# Patient Record
Sex: FEMALE | Race: WHITE | NOT HISPANIC OR LATINO | Employment: UNEMPLOYED | URBAN - NONMETROPOLITAN AREA
[De-identification: names, ages, dates, MRNs, and addresses within clinical notes are randomized per-mention and may not be internally consistent; named-entity substitution may affect disease eponyms.]

---

## 2021-01-13 ENCOUNTER — OFFICE VISIT (OUTPATIENT)
Dept: PSYCHIATRY | Facility: CLINIC | Age: 51
End: 2021-01-13

## 2021-01-13 VITALS
DIASTOLIC BLOOD PRESSURE: 88 MMHG | BODY MASS INDEX: 29.35 KG/M2 | SYSTOLIC BLOOD PRESSURE: 126 MMHG | HEIGHT: 67 IN | HEART RATE: 89 BPM | WEIGHT: 187 LBS

## 2021-01-13 DIAGNOSIS — R45.4 ANGER: ICD-10-CM

## 2021-01-13 DIAGNOSIS — F33.2 SEVERE EPISODE OF RECURRENT MAJOR DEPRESSIVE DISORDER, WITHOUT PSYCHOTIC FEATURES (HCC): ICD-10-CM

## 2021-01-13 DIAGNOSIS — Z79.899 MEDICATION MANAGEMENT: ICD-10-CM

## 2021-01-13 DIAGNOSIS — F41.1 GAD (GENERALIZED ANXIETY DISORDER): ICD-10-CM

## 2021-01-13 DIAGNOSIS — F43.10 PTSD (POST-TRAUMATIC STRESS DISORDER): Primary | ICD-10-CM

## 2021-01-13 LAB
AMPHETAMINE CUT-OFF: ABNORMAL
BENZODIAZIPINE CUT-OFF: ABNORMAL
BUPRENORPHINE CUT-OFF: ABNORMAL
COCAINE CUT-OFF: ABNORMAL
EXTERNAL AMPHETAMINE SCREEN URINE: NEGATIVE
EXTERNAL BENZODIAZEPINE SCREEN URINE: POSITIVE
EXTERNAL BUPRENORPHINE SCREEN URINE: NEGATIVE
EXTERNAL COCAINE SCREEN URINE: NEGATIVE
EXTERNAL MDMA: NEGATIVE
EXTERNAL METHADONE SCREEN URINE: NEGATIVE
EXTERNAL METHAMPHETAMINE SCREEN URINE: NEGATIVE
EXTERNAL OPIATES SCREEN URINE: NEGATIVE
EXTERNAL OXYCODONE SCREEN URINE: NEGATIVE
EXTERNAL THC SCREEN URINE: NEGATIVE
MDMA CUT-OFF: ABNORMAL
METHADONE CUT-OFF: ABNORMAL
METHAMPHETAMINE CUT-OFF: ABNORMAL
OPIATES CUT-OFF: ABNORMAL
OXYCODONE CUT-OFF: ABNORMAL
THC CUT-OFF: ABNORMAL

## 2021-01-13 PROCEDURE — 99204 OFFICE O/P NEW MOD 45 MIN: CPT | Performed by: NURSE PRACTITIONER

## 2021-01-13 RX ORDER — QUETIAPINE FUMARATE 25 MG/1
25 TABLET, FILM COATED ORAL NIGHTLY
Qty: 30 TABLET | Refills: 0 | Status: SHIPPED | OUTPATIENT
Start: 2021-01-13 | End: 2021-03-15

## 2021-01-13 RX ORDER — NORETHINDRONE ACETATE/ETHINYL ESTRADIOL 1.5-0.03MG
KIT ORAL
COMMUNITY
Start: 2021-01-07 | End: 2021-05-10

## 2021-01-13 RX ORDER — FLUOXETINE HYDROCHLORIDE 40 MG/1
80 CAPSULE ORAL DAILY
Qty: 30 CAPSULE | Refills: 1 | Status: SHIPPED | OUTPATIENT
Start: 2021-01-13 | End: 2021-03-15 | Stop reason: SDUPTHER

## 2021-01-13 RX ORDER — LINACLOTIDE 145 UG/1
CAPSULE, GELATIN COATED ORAL
COMMUNITY
Start: 2020-12-24

## 2021-01-13 RX ORDER — ATENOLOL 25 MG/1
25 TABLET ORAL DAILY
COMMUNITY
End: 2021-06-21

## 2021-01-13 RX ORDER — ONDANSETRON HYDROCHLORIDE 8 MG/1
TABLET, FILM COATED ORAL EVERY 8 HOURS PRN
COMMUNITY

## 2021-01-13 RX ORDER — GABAPENTIN 300 MG/1
CAPSULE ORAL
COMMUNITY
Start: 2020-12-31

## 2021-01-13 RX ORDER — CYCLOBENZAPRINE HCL 10 MG
TABLET ORAL
COMMUNITY
Start: 2020-12-31 | End: 2021-06-21 | Stop reason: SDUPTHER

## 2021-01-13 RX ORDER — PRAZOSIN HYDROCHLORIDE 1 MG/1
1 CAPSULE ORAL 2 TIMES DAILY PRN
Qty: 60 CAPSULE | Refills: 0 | Status: SHIPPED | OUTPATIENT
Start: 2021-01-13 | End: 2021-03-15 | Stop reason: SINTOL

## 2021-01-13 RX ORDER — FLUOXETINE HYDROCHLORIDE 20 MG/1
CAPSULE ORAL
COMMUNITY
Start: 2021-01-11 | End: 2021-01-13 | Stop reason: HOSPADM

## 2021-01-13 RX ORDER — IBUPROFEN 600 MG/1
TABLET ORAL
COMMUNITY
Start: 2020-12-31

## 2021-01-13 RX ORDER — LORATADINE 10 MG/1
TABLET ORAL DAILY PRN
COMMUNITY
Start: 2021-01-11

## 2021-01-13 RX ORDER — HYDROCODONE BITARTRATE AND ACETAMINOPHEN 5; 325 MG/1; MG/1
TABLET ORAL
COMMUNITY
Start: 2020-12-24

## 2021-01-13 RX ORDER — LATANOPROST 50 UG/ML
SOLUTION/ DROPS OPHTHALMIC
COMMUNITY
Start: 2020-12-17

## 2021-01-13 RX ORDER — VALSARTAN 80 MG/1
TABLET ORAL
COMMUNITY
Start: 2020-12-29 | End: 2021-06-21 | Stop reason: SDDI

## 2021-01-13 RX ORDER — OMEPRAZOLE 20 MG/1
CAPSULE, DELAYED RELEASE ORAL
COMMUNITY
Start: 2021-01-11

## 2021-01-13 RX ORDER — DIAZEPAM 5 MG/1
TABLET ORAL
COMMUNITY
Start: 2020-12-17

## 2021-01-13 RX ORDER — MISOPROSTOL 100 UG/1
TABLET ORAL
COMMUNITY
Start: 2020-11-12 | End: 2021-03-16 | Stop reason: CLARIF
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
## 2021-03-15 ENCOUNTER — OFFICE VISIT (OUTPATIENT)
Dept: PSYCHIATRY | Facility: CLINIC | Age: 51
End: 2021-03-15

## 2021-03-15 VITALS
HEIGHT: 67 IN | DIASTOLIC BLOOD PRESSURE: 86 MMHG | HEART RATE: 91 BPM | BODY MASS INDEX: 27.53 KG/M2 | SYSTOLIC BLOOD PRESSURE: 142 MMHG | WEIGHT: 175.4 LBS

## 2021-03-15 DIAGNOSIS — F43.10 PTSD (POST-TRAUMATIC STRESS DISORDER): Primary | ICD-10-CM

## 2021-03-15 DIAGNOSIS — R45.4 ANGER: ICD-10-CM

## 2021-03-15 DIAGNOSIS — F33.2 SEVERE EPISODE OF RECURRENT MAJOR DEPRESSIVE DISORDER, WITHOUT PSYCHOTIC FEATURES (HCC): ICD-10-CM

## 2021-03-15 DIAGNOSIS — F41.1 GENERALIZED ANXIETY DISORDER WITH PANIC ATTACKS: ICD-10-CM

## 2021-03-15 DIAGNOSIS — Z79.899 MEDICATION MANAGEMENT: ICD-10-CM

## 2021-03-15 DIAGNOSIS — F41.0 GENERALIZED ANXIETY DISORDER WITH PANIC ATTACKS: ICD-10-CM

## 2021-03-15 PROCEDURE — 99214 OFFICE O/P EST MOD 30 MIN: CPT | Performed by: NURSE PRACTITIONER

## 2021-03-15 RX ORDER — BUSPIRONE HYDROCHLORIDE 7.5 MG/1
7.5 TABLET ORAL 2 TIMES DAILY
Qty: 30 TABLET | Refills: 1 | Status: SHIPPED | OUTPATIENT
Start: 2021-03-15 | End: 2021-04-12

## 2021-03-15 RX ORDER — FLUOXETINE HYDROCHLORIDE 40 MG/1
80 CAPSULE ORAL DAILY
Qty: 30 CAPSULE | Refills: 1 | Status: SHIPPED | OUTPATIENT
Start: 2021-03-15 | End: 2021-04-12 | Stop reason: SDUPTHER

## 2021-03-15 RX ORDER — FLUOXETINE HYDROCHLORIDE 20 MG/1
CAPSULE ORAL
COMMUNITY
Start: 2021-03-11 | End: 2021-03-15

## 2021-03-15 RX ORDER — ARIPIPRAZOLE 2 MG/1
2 TABLET ORAL DAILY
Qty: 30 TABLET | Refills: 1 | Status: SHIPPED | OUTPATIENT
Start: 2021-03-15 | End: 2021-04-12

## 2021-03-15 NOTE — PROGRESS NOTES
Chief Complaint  Depression, anxiety, panic trauma symptoms, anger    Subjective        Dian Tanner presents to [facility] for her follow up  for psychotropic medication management     History of Present Illness   Dian brewer was recently quarantined for  Covid, and got out on 2/22/21 she ran a temperature for 3 weeks and still feels tired.  Her  also had Covid, had to be hospitalized  and remains on home oxygen.  She reports she takes Valium and Flexeril every night and is sleeping better.She has no problems falling asleep or staying asleep and has had no nightmares since her last visit.  Average duration is 5-6 hours.  She has been taking a 2-hour nap during the day since being diagnosed with COVID. She reports her diet is good.  She is counting carbohydrates with a goal to lose weight.  Her mood continues to vary day-to-day.  She reports going from happy to mad without cause.  She sits and thinks about events from her past and things she should have done differently. She reports anger is related to her step dad and  harbors resentment and regret over not being more protective over her mother.  She was blamed for everything as a child and assumes blame for everything as an adult. She catastrophizes situations. She  does not ask her kids to come over, because she is afraid they will be in a car accident on the way over and she would be responsible.  Her PHQ score 17.  She identifies the following symptoms of MDD occurring over the last two weeks: anhedonia, feeling depressed, fatigue, anorexia, poor self-esteem, trouble concentrating, restlessness.  Her ANAI score is 20.  She identifies the following symptoms: Feeling nervous, inability to control worry, generalized worry, trouble relaxing, restlessness, irritability, and catastrophizing.  She experienced panic attacks daily when her  was in the hospital .They were spontaneous and lasted 15-20 minutes.   "Symptoms include SOA, increased heart rate, cold sweats, chills, tremors.  She reports over the years she has learned to manage them by breathing techniques but they still occur and are bothersome.    Objective   Vital Signs:   /86   Pulse 91   Ht 170.2 cm (67\")   Wt 79.6 kg (175 lb 6.4 oz)   BMI 27.47 kg/m²       PHQ-9 Score:   PHQ-9 Total Score: 17     Mental Status Exam:   Hygiene:   good  Cooperation:  Cooperative  Eye Contact:  Good  Psychomotor Behavior:  Appropriate  Affect:  Appropriate  Mood:  depressed  Speech:  Normal  Thought Process:  Goal directed and Linear  Thought Content:  Normal recalling events of her past  Suicidal:  None  Homicidal:  None  Hallucinations:  None  Delusion:  None  Memory:  Intact  Orientation:  Person, Place, Time and Situation  Reliability:  good  Insight:  Good  Judgement:  Good  Impulse Control:  Fair  Physical/Medical Issues:  HTN, arthritis, chronic back pain     Current Medications:   Current Outpatient Medications   Medication Sig Dispense Refill   • atenolol (TENORMIN) 25 MG tablet Take 25 mg by mouth Daily.     • cyclobenzaprine (FLEXERIL) 10 MG tablet      • diazePAM (VALIUM) 5 MG tablet      • gabapentin (NEURONTIN) 300 MG capsule      • HYDROcodone-acetaminophen (NORCO) 5-325 MG per tablet      • ibuprofen (ADVIL,MOTRIN) 600 MG tablet      • Linzess 145 MCG capsule capsule      • loratadine (CLARITIN) 10 MG tablet      • omeprazole (priLOSEC) 20 MG capsule      • valsartan (DIOVAN) 80 MG tablet      • ARIPiprazole (Abilify) 2 MG tablet Take 1 tablet by mouth Daily. 30 tablet 1   • busPIRone (BUSPAR) 7.5 MG tablet Take 1 tablet by mouth 2 (Two) Times a Day. Take always with food or always without food 30 tablet 1   • FLUoxetine (PROzac) 40 MG capsule Take 2 capsules by mouth Daily for 30 days. 30 capsule 1   • Yinka 1.5/30 1.5-30 MG-MCG tablet      • latanoprost (XALATAN) 0.005 % ophthalmic solution      • ondansetron (ZOFRAN) 8 MG tablet Take  by mouth Every " 8 (Eight) Hours As Needed for Nausea or Vomiting.       No current facility-administered medications for this visit.     Physical Exam  Vitals reviewed.   Constitutional:       Appearance: Normal appearance. She is normal weight.   Neurological:      Mental Status: She is alert and oriented to person, place, and time.      Gait: Gait is intact.   Psychiatric:         Attention and Perception: Attention and perception normal.         Mood and Affect: Mood is depressed.         Speech: Speech normal.         Behavior: Behavior normal. Behavior is cooperative.         Thought Content: Thought content normal.         Cognition and Memory: She exhibits impaired remote memory.      Comments: Fatigue, anhedonia, anorexia, trouble concentrating, restlessnes, irritability, racing heart, SOA, tremors, chills, anxiety         .Diagnoses and all orders for this visit:    1. PTSD (post-traumatic stress disorder) (Primary)  -     FLUoxetine (PROzac) 40 MG capsule; Take 2 capsules by mouth Daily for 30 days.  Dispense: 30 capsule; Refill: 1  -     ARIPiprazole (Abilify) 2 MG tablet; Take 1 tablet by mouth Daily.  Dispense: 30 tablet; Refill: 1    2. Severe episode of recurrent major depressive disorder, without psychotic features (CMS/HCC)  -     FLUoxetine (PROzac) 40 MG capsule; Take 2 capsules by mouth Daily for 30 days.  Dispense: 30 capsule; Refill: 1  -     ARIPiprazole (Abilify) 2 MG tablet; Take 1 tablet by mouth Daily.  Dispense: 30 tablet; Refill: 1  -     busPIRone (BUSPAR) 7.5 MG tablet; Take 1 tablet by mouth 2 (Two) Times a Day. Take always with food or always without food  Dispense: 30 tablet; Refill: 1    3. Generalized anxiety disorder with panic attacks  -     FLUoxetine (PROzac) 40 MG capsule; Take 2 capsules by mouth Daily for 30 days.  Dispense: 30 capsule; Refill: 1  -     busPIRone (BUSPAR) 7.5 MG tablet; Take 1 tablet by mouth 2 (Two) Times a Day. Take always with food or always without food  Dispense: 30  tablet; Refill: 1    4. Anger  -     FLUoxetine (PROzac) 40 MG capsule; Take 2 capsules by mouth Daily for 30 days.  Dispense: 30 capsule; Refill: 1  -     ARIPiprazole (Abilify) 2 MG tablet; Take 1 tablet by mouth Daily.  Dispense: 30 tablet; Refill: 1  -     busPIRone (BUSPAR) 7.5 MG tablet; Take 1 tablet by mouth 2 (Two) Times a Day. Take always with food or always without food  Dispense: 30 tablet; Refill: 1    5. Medication management    Patient reports she was unable to tolerate the prazosin and the Seroquel due to it increasing her heart rate and blood pressure.  However she is uncertain if it was a medication for panic attack.  Dian's treatment priorities for today include anger and anxiety.  She reports she is taking the Prozac but it is not helping anger.  Patient advised Prozac is indicated for anxiety depression rather than agitation.  Benefits, risks, potential side effects of adding BuSpar and Abilify discussed.  Patient and provider collaboratively agree to add the medications to her treatment plan.    -Continue Prozac 40 mg - 2 capsules daily for symptoms of anxiety and depression  -Start BuSpar 7.5 mg twice daily for symptoms of anxiety.  Patient advised to always take medication with food or without food to increase absorption  -Start Abilify 2 mg daily for symptoms of agitation and to help augment Prozac for depression  -May consider adding Lamictal at a later date  Reports reviewed include [facility] report, [facility] NP note dated 1/13/2021    TREATMENT PLAN/GOALS:    Short Term  1. Pt will keep all appts for med mgt and psychotherapy  2. Pt will take medications as prescribed and report intolerable side effects  3. Pt will pursue psychotherapy services to help gain coping skill and process trauma    Long term:   1. Pt will exhibit emotional stability  2. Pt will use coping skills to work through depression  3. Pt will manage feeling of anger using anger management strategies    MEDICATION ISSUES:  We  discussed risks, benefits, and side effects of the above medications and the patient was agreeable with the plan. Patient was educated on the importance of compliance with treatment and follow-up appointments.  Patient is agreeable to call the office with any worsening of symptoms or onset of side effects. Patient is agreeable to call 911 or go to the nearest ER should she begin having SI/HI.     MEDS ORDERED DURING VISIT:  New Medications Ordered This Visit   Medications   • FLUoxetine (PROzac) 40 MG capsule     Sig: Take 2 capsules by mouth Daily for 30 days.     Dispense:  30 capsule     Refill:  1   • ARIPiprazole (Abilify) 2 MG tablet     Sig: Take 1 tablet by mouth Daily.     Dispense:  30 tablet     Refill:  1   • busPIRone (BUSPAR) 7.5 MG tablet     Sig: Take 1 tablet by mouth 2 (Two) Times a Day. Take always with food or always without food     Dispense:  30 tablet     Refill:  1     Follow Up   Return in about 4 weeks (around 4/12/2021).    This document has been electronically signed by DELVIS Fox  March 15, 2021 10:18 EDT    Part of this note may be an electronic transcription/translation of spoken language to printed text using the Dragon Dictation System.

## 2021-03-16 ENCOUNTER — OFFICE VISIT (OUTPATIENT)
Dept: PSYCHIATRY | Facility: CLINIC | Age: 51
End: 2021-03-16

## 2021-03-16 DIAGNOSIS — F41.1 GENERALIZED ANXIETY DISORDER WITH PANIC ATTACKS: ICD-10-CM

## 2021-03-16 DIAGNOSIS — F33.2 SEVERE EPISODE OF RECURRENT MAJOR DEPRESSIVE DISORDER, WITHOUT PSYCHOTIC FEATURES (HCC): ICD-10-CM

## 2021-03-16 DIAGNOSIS — F51.4 ADULT NIGHT TERRORS: ICD-10-CM

## 2021-03-16 DIAGNOSIS — F43.10 PTSD (POST-TRAUMATIC STRESS DISORDER): Primary | ICD-10-CM

## 2021-03-16 DIAGNOSIS — F41.0 GENERALIZED ANXIETY DISORDER WITH PANIC ATTACKS: ICD-10-CM

## 2021-03-16 PROCEDURE — 90791 PSYCH DIAGNOSTIC EVALUATION: CPT | Performed by: COUNSELOR

## 2021-03-16 PROCEDURE — 90785 PSYTX COMPLEX INTERACTIVE: CPT | Performed by: COUNSELOR
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
## 2021-03-31 NOTE — PLAN OF CARE
Problem: Depression  Goal: Patient will demonstrate the ability to initiate new constructive life skills outside of sessions on a consistent basis.  Outcome: Ongoing, Progressing     Problem: Post Traumatic Stress  Goal: Patient will process and move through trauma in a way that improves self regard and the patients ability to function optimally in the world around them.  Outcome: Ongoing, Progressing

## 2021-03-31 NOTE — TREATMENT PLAN
Multi-Disciplinary Problems (from Behavioral Health Treatment Plan)    Active Problems     Problem: Depression  Start Date: 03/16/21    Problem Details: The patient self-scales this problem as a 7 with 10 being the worst.        Goal Priority Start Date Expected End Date End Date    Patient will demonstrate the ability to initiate new constructive life skills outside of sessions on a consistent basis. -- 03/16/21 -- --    Goal Details: Progress toward goal:  Not appropriate to rate progress toward goal since this is the initial treatment plan.        Goal Intervention Frequency Start Date End Date    Assist patient in setting attainable activities of daily living goals. PRN 03/16/21 --    Goal Intervention Frequency Start Date End Date    Provide education about depression Weekly 03/16/21 --    Intervention Details: Duration of treatment until until remission of symptoms.        Goal Intervention Frequency Start Date End Date    Assist patient in developing healthy coping strategies. Weekly 03/16/21 --    Intervention Details: Duration of treatment until until remission of symptoms.              Problem: Post Traumatic Stress  Start Date: 03/16/21    Problem Details: The patient self-scales this problem as a 9 with 10 being the worst.        Goal Priority Start Date Expected End Date End Date    Patient will process and move through trauma in a way that improves self regard and the patients ability to function optimally in the world around them. -- 03/16/21 -- --    Goal Details: Progress toward goal:  Not appropriate to rate progress toward goal since this is the initial treatment plan.        Goal Intervention Frequency Start Date End Date    Assist patient in identifying ways that trauma has negatively impacted their view of themselves and the world. Weekly 03/16/21 --    Intervention Details: Duration of treatment until until remission of symptoms.        Goal Intervention Frequency Start Date End Date     Process trauma in the context of the safe session environment. Weekly 03/16/21 --    Intervention Details: Duration of treatment until until remission of symptoms.        Goal Intervention Frequency Start Date End Date    Develop a plan of behavior changes that will reduce the stress of the trauma. Weekly 03/16/21 --    Intervention Details: Duration of treatment until until remission of symptoms.                           I have discussed and reviewed this treatment plan with the patient.  It has been printed for signatures.

## 2021-03-31 NOTE — PATIENT INSTRUCTIONS
"[email or web address]">   Generalized Anxiety Disorder, Adult  Generalized anxiety disorder (ANAI) is a mental health condition. Unlike normal worries, anxiety related to ANAI is not triggered by a specific event. These worries do not fade or get better with time. ANAI interferes with relationships, work, and school.  ANAI symptoms can vary from mild to severe. People with severe ANAI can have intense waves of anxiety with physical symptoms that are similar to panic attacks.  What are the causes?  The exact cause of ANAI is not known, but the following are believed to have an impact:  · Differences in natural brain chemicals.  · Genes passed down from parents to children.  · Differences in the way threats are perceived.  · Development during childhood.  · Personality.  What increases the risk?  The following factors may make you more likely to develop this condition:  · Being female.  · Having a family history of anxiety disorders.  · Being very shy.  · Experiencing very stressful life events, such as the death of a loved one.  · Having a very stressful family environment.  What are the signs or symptoms?  People with ANAI often worry excessively about many things in their lives, such as their health and family. Symptoms may also include:  · Mental and emotional symptoms:  ? Worrying excessively about natural disasters.  ? Fear of being late.  ? Difficulty concentrating.  ? Fears that others are judging your performance.  · Physical symptoms:  ? Fatigue.  ? Headaches, muscle tension, muscle twitches, trembling, or feeling shaky.  ? Feeling like your heart is pounding or beating very fast.  ? Feeling out of breath or like you cannot take a deep breath.  ? Having trouble falling asleep or staying asleep, or experiencing restlessness.  ? Sweating.  ? Nausea, diarrhea, or irritable bowel syndrome (IBS).  · Behavioral symptoms:  ? Experiencing erratic moods or irritability.  ? Avoidance of new situations.  ? Avoidance of " people.  ? Extreme difficulty making decisions.  How is this diagnosed?  This condition is diagnosed based on your symptoms and medical history. You will also have a physical exam. Your health care provider may perform tests to rule out other possible causes of your symptoms.  To be diagnosed with ANAI, a person must have anxiety that:  · Is out of his or her control.  · Affects several different aspects of his or her life, such as work and relationships.  · Causes distress that makes him or her unable to take part in normal activities.  · Includes at least three symptoms of ANAI, such as restlessness, fatigue, trouble concentrating, irritability, muscle tension, or sleep problems.  Before your health care provider can confirm a diagnosis of ANAI, these symptoms must be present more days than they are not, and they must last for 6 months or longer.  How is this treated?  This condition may be treated with:  · Medicine. Antidepressant medicine is usually prescribed for long-term daily control. Anti-anxiety medicines may be added in severe cases, especially when panic attacks occur.  · Talk therapy (psychotherapy). Certain types of talk therapy can be helpful in treating ANAI by providing support, education, and guidance. Options include:  ? Cognitive behavioral therapy (CBT). People learn coping skills and self-calming techniques to ease their physical symptoms. They learn to identify unrealistic thoughts and behaviors and to replace them with more appropriate thoughts and behaviors.  ? Acceptance and commitment therapy (ACT). This treatment teaches people how to be mindful as a way to cope with unwanted thoughts and feelings.  ? Biofeedback. This process trains you to manage your body's response (physiological response) through breathing techniques and relaxation methods. You will work with a therapist while machines are used to monitor your physical symptoms.  · Stress management techniques. These include yoga,  meditation, and exercise.  A mental health specialist can help determine which treatment is best for you. Some people see improvement with one type of therapy. However, other people require a combination of therapies.  Follow these instructions at home:  Lifestyle  · Maintain a consistent routine and schedule.  · Anticipate stressful situations. Create a plan, and allow extra time to work with your plan.  · Practice stress management or self-calming techniques that you have learned from your therapist or your health care provider.  General instructions  · Take over-the-counter and prescription medicines only as told by your health care provider.  · Understand that you are likely to have setbacks. Accept this and be kind to yourself as you persist to take better care of yourself.  · Recognize and accept your accomplishments, even if you  them as small.  · Keep all follow-up visits as told by your health care provider. This is important.  Contact a health care provider if:  · Your symptoms do not get better.  · Your symptoms get worse.  · You have signs of depression, such as:  ? A persistently sad or irritable mood.  ? Loss of enjoyment in activities that used to bring you amina.  ? Change in weight or eating.  ? Changes in sleeping habits.  ? Avoiding friends or family members.  ? Loss of energy for normal tasks.  ? Feelings of guilt or worthlessness.  Get help right away if:  · You have serious thoughts about hurting yourself or others.  If you ever feel like you may hurt yourself or others, or have thoughts about taking your own life, get help right away. Go to your nearest emergency department or:  · Call your local emergency services (781 in the [place]  · Call a suicide crisis helpline, such as the National Suicide Prevention Lifeline at 1-570.735.8400. This is open 24 hours a day in the [place]  · Text the Crisis Text Line at 193958 (in the [place]  Summary  · Generalized anxiety disorder (ANAI) is a mental  health condition that involves worry that is not triggered by a specific event.  · People with ANAI often worry excessively about many things in their lives, such as their health and family.  · ANAI may cause symptoms such as restlessness, trouble concentrating, sleep problems, frequent sweating, nausea, diarrhea, headaches, and trembling or muscle twitching.  · A mental health specialist can help determine which treatment is best for you. Some people see improvement with one type of therapy. However, other people require a combination of therapies.  This information is not intended to replace advice given to you by your health care provider. Make sure you discuss any questions you have with your health care provider.  Document Revised: 10/07/2020 Document Reviewed: 10/07/2020  Elsevier Patient Education © 2021 [employer]  Major Depressive Disorder, Adult  Major depressive disorder is a mental health condition. This disorder affects feelings. It can also affect the body. Symptoms of this condition last most of the day, almost every day, for 2 weeks. This disorder can affect:  · Relationships.  · Daily activities, such as work and school.  · Activities that you normally like to do.  What are the causes?  The cause of this condition is not known. The disorder is likely caused by a mix of things, including:  · Your personality, such as being a shy person.  · Your behavior, or how you act toward others.  · Your thoughts and feelings.  · Too much alcohol or drugs.  · How you react to stress.  · Health and mental problems that you have had for a long time.  · Things that hurt you in the past (trauma).  · Big changes in your life, such as divorce.  What increases the risk?  The following factors may make you more likely to develop this condition:  · Having family members with depression.  · Being a woman.  · Problems in the family.  · Low levels of some brain chemicals.  · Things that caused you pain as a child,  especially if you lost a parent or were abused.  · A lot of stress in your life, such as from:  ? Living without basic needs of life, such as food and shelter.  ? Being treated poorly because of race, sex, or Orthodox (discrimination).  · Health and mental problems that you have had for a long time.  What are the signs or symptoms?  The main symptoms of this condition are:  · Being sad all the time.  · Being grouchy all the time.  · Loss of interest in things and activities.  Other symptoms include:  · Sleeping too much or too little.  · Eating too much or too little.  · Gaining or losing weight, without knowing why.  · Feeling tired or having low energy.  · Being restless and weak.  · Feeling hopeless, worthless, or guilty.  · Trouble thinking clearly or making decisions.  · Thoughts of hurting yourself or others, or thoughts of ending your life.  · Spending a lot of time alone.  · Inability to complete common tasks of daily life.  If you have very bad MDD, you may:  · Believe things that are not true.  · Hear, see, taste, or feel things that are not there.  · Have mild depression that lasts for at least 2 years.  · Feel very sad and hopeless.  · Have trouble speaking or moving.  How is this treated?  This condition may be treated with:  · Talk therapy. This teaches you to know bad thoughts, feelings, and actions and how to change them.  ? This can also help you to communicate with others.  ? This can be done with members of your family.  · Medicines. These can be used to treat worry (anxiety), depression, or low levels of chemicals in the brain.  · Lifestyle changes. You may need to:  ? Limit alcohol use.  ? Limit drug use.  ? Get regular exercise.  ? Get plenty of sleep.  ? Make healthy eating choices.  ? Spend more time outdoors.  · Brain stimulation. This treatment excites the brain. This is done when symptoms are very bad or have not gotten better with other treatments.  Follow these instructions at  home:  Activity  · Get regular exercise as told.  · Spend time outdoors as told.  · Make time to do the things you enjoy.  · Find ways to deal with stress. Try to:  ? Meditate.  ? Do deep breathing.  ? Spend time in nature.  ? Keep a journal.  · Return to your normal activities as told by your doctor. Ask your doctor what activities are safe for you.  Alcohol and drug use  · If you drink alcohol:  ? Limit how much you use to:  § 0-1 drink a day for women.  § 0-2 drinks a day for men.  ? Be aware of how much alcohol is in your drink. In the [place] one drink equals one 12 oz bottle of beer (355 mL), one 5 oz glass of wine (148 mL), or one 1½ oz glass of hard liquor (44 mL).  · Talk to your doctor about:  ? Alcohol use. Alcohol can affect some medicines.  ? Any drug use.  General instructions    · Take over-the-counter and prescription medicines and herbal preparations only as told by your doctor.  · Eat a healthy diet.  · Get a lot of sleep.  · Think about joining a support group. Your doctor may be able to suggest one.  · Keep all follow-up visits as told by your doctor. This is important.  Where to find more information:  · National [place] on Mental Illness: www.rajinder.org  · [place] National [place] of Mental Health: www.nimh.nih.gov  · American Psychiatric Association: www.psychiatry.org/patients-families/  Contact a doctor if:  · Your symptoms get worse.  · You get new symptoms.  Get help right away if:  · You hurt yourself.  · You have serious thoughts about hurting yourself or others.  · You see, hear, taste, smell, or feel things that are not there.  If you ever feel like you may hurt yourself or others, or have thoughts about taking your own life, get help right away. Go to your nearest emergency department or:  · Call your local emergency services (911 in the [place]  · Call a suicide crisis helpline, such as the National Suicide Prevention Lifeline at 1-453.840.3796. This is open 24 hours a day in the  [place]  · Text the Crisis Text Line at 793381 (in the [place]  Summary  · Major depressive disorder is a mental health condition. This disorder affects feelings. Symptoms of this condition last most of the day, almost every day, for 2 weeks.  · The symptoms of this disorder can cause problems with relationships and with daily activities.  · There are treatments and support for people who get this disorder. You may need more than one type of treatment.  · Get help right away if you have serious thoughts about hurting yourself or others.  This information is not intended to replace advice given to you by your health care provider. Make sure you discuss any questions you have with your health care provider.  Document Revised: 11/28/2020 Document Reviewed: 11/28/2020  [employer] Patient Education © 2021 [employer]  Post-Traumatic Stress Disorder, Adult  Post-traumatic stress disorder (PTSD) is a mental health disorder that can occur after a traumatic event, such as a threat to life, serious injury, or sexual violence. Sometimes, PTSD can occur in people who hear about trauma that occurs to a close family member or friend. PTSD can happen to anyone at any age.  What are the causes?  The condition may be caused by experiencing a traumatic event.  What increases the risk?  This condition is more likely to occur in:  ·  servicemen and servicewomen.  · People who are in circumstances where their lives are threatened.  · People who have been the victim of, or witness to, a traumatic event, such as:  ? Domestic violence.  ? Physical or sexual abuse.  ? Rape.  ? A terrorist act or gun violence.  ? Natural disasters.  ? Accidents involving serious injury.  What are the signs or symptoms?  PTSD symptoms may start soon after a frightening event or months or years later. Symptoms last at least one month and tend to disrupt relationships, work, and daily activities. Symptoms of PTSD can be grouped into several  categories.  Intrusive symptoms  This is when you re-experience the physical and emotional sensations of the traumatic event through one or more of the following ways:  · Having upsetting dreams.  · Feeling fear, horror, intense sadness, or anger in response to a reminder of the trauma.  · Having unwanted upsetting memories while awake.  · Having physical reactions triggered by reminders of the trauma, such as increased heart rate, shortness of breath, sweating, and shaking.  · Having flashbacks, or feeling like you are going through the event again.  Avoidance symptoms  This is when you avoid anything that reminds you of the trauma. Symptoms may also include:  · Losing interest or not participating in daily activities.  · Feeling disconnected from or avoiding other people.  · Isolating yourself.  Increased arousal symptoms  You may have physical or emotional reactions triggered by your environment. Symptoms may include:  · Being easily startled.  · Behaving in a careless or self-destructive way.  · Becoming easily irritated.  · Feeling worried and nervous.  · Having trouble concentrating.  · Yelling at or hitting other people or objects.  · Having trouble sleeping.  Negative mood and thoughts  · Believing that you or others are bad.  · Feeling fear, horror, anger, sadness, guilt, or shame regularly.  · Not being able to remember certain parts of the traumatic event.  · Blaming yourself or others for the trauma.  · Being unable to experience positive emotions, such as happiness or love.  How is this diagnosed?  PTSD is diagnosed through an assessment by a mental health professional. You will be asked questions about your symptoms.  How is this treated?  Treatment for this condition may include any of the following or a combination:  · Taking medicines to reduce PTSD symptoms.  · Having counseling with a mental health professional or therapist who is experienced in treating PTSD.  · Doing eye movement desensitization  and reprocessing therapy (EMDR). This type of therapy occurs with a specialized therapist.  If you have other mental health concerns, these conditions will also be treated.  Follow these instructions at home:  Lifestyle  · Find a support group in your community. Groups are often available for  veterans, trauma victims, and family members or caregivers.  · Try to get 7-9 hours of sleep each night. To help with sleep:  ? Keep your bedroom cool and dark.  ? Do not eat a heavy meal within 1 hour of bedtime.  ? Do not drink alcohol or caffeinated drinks before bed.  ? Avoid screen time, such as television, computers, tablets, or mobile phones, before bed.  · Do not use illegal drugs.  · Contact a local organization to find out if you are eligible for a service dog.  Activity  · Exercise regularly. Try to do at least 30 minutes of physical activity most days of the week.  · Practice self-calming through:  ? Breathing exercises.  ? Meditation.  ? Yoga.  ? Listening to quiet music.  · Do not isolate yourself. Make connections with other people.  · Consider volunteering. Volunteering can help you feel more connected.  Eating and drinking  · Do not drink alcohol if:  ? Your health care provider tells you not to drink.  ? You are pregnant, may be pregnant, or are planning to become pregnant.  · If you drink alcohol:  ? Limit how much you use to:  § 0-1 drink a day for women.  § 0-2 drinks a day for men.  ? Be aware of how much alcohol is in your drink. In the [place] one drink equals one 12 oz bottle of beer (355 mL), one 5 oz glass of wine (148 mL), or one 1½ oz glass of hard liquor (44 mL).  General instructions  · Take steps to help yourself feel safer at home, such as by installing a security system.  · Work with a health care provider or therapist to help manage your symptoms.  · Take over-the-counter and prescription medicines as told by your health care provider.  · Let others know that you have PTSD and the  things that may trigger symptoms. This can protect you and help them understand you better.  · If your PTSD is affecting your marriage or family, seek help from a family therapist.  · Make sure to let all of your health care providers know you have PTSD. This is especially important if you are having surgery or need to be admitted to the hospital.  · Keep all follow-up visits as told by your health care provider. This is important.  Contact a health care provider if:  · Your symptoms do not get better.  · You are feeling overwhelmed by your symptoms.  Get help right away if:  · You have thoughts of hurting yourself or others.  If you ever feel like you may hurt yourself or others, or have thoughts about taking your own life, get help right away. You can go to your nearest emergency department or call:  · Your local emergency services (911 in the [place]  · A suicide crisis helpline, such as the National Suicide Prevention Lifeline at 1-170.582.4289. This is open 24 hours a day.  Summary  · Post-traumatic stress disorder (PTSD) is a mental health disorder that can occur after a traumatic event.  · Treatment for PTSD may include medicines, counseling, eye movement desensitization and reprocessing therapy (EMDR), or a combination of therapies.  · Find a support group in your community.  · Get help right away if you have thoughts of hurting yourself or others.  This information is not intended to replace advice given to you by your health care provider. Make sure you discuss any questions you have with your health care provider.  Document Revised: 02/27/2020 Document Reviewed: 02/27/2020  Elsevier Patient Education © 2021 [employer]

## 2021-04-12 ENCOUNTER — OFFICE VISIT (OUTPATIENT)
Dept: PSYCHIATRY | Facility: CLINIC | Age: 51
End: 2021-04-12

## 2021-04-12 VITALS
DIASTOLIC BLOOD PRESSURE: 84 MMHG | HEART RATE: 92 BPM | WEIGHT: 171.6 LBS | BODY MASS INDEX: 26.93 KG/M2 | HEIGHT: 67 IN | SYSTOLIC BLOOD PRESSURE: 122 MMHG

## 2021-04-12 DIAGNOSIS — F41.1 GENERALIZED ANXIETY DISORDER WITH PANIC ATTACKS: ICD-10-CM

## 2021-04-12 DIAGNOSIS — F33.2 SEVERE EPISODE OF RECURRENT MAJOR DEPRESSIVE DISORDER, WITHOUT PSYCHOTIC FEATURES (HCC): ICD-10-CM

## 2021-04-12 DIAGNOSIS — F43.10 PTSD (POST-TRAUMATIC STRESS DISORDER): Primary | ICD-10-CM

## 2021-04-12 DIAGNOSIS — R45.4 ANGER: ICD-10-CM

## 2021-04-12 DIAGNOSIS — Z79.899 MEDICATION MANAGEMENT: ICD-10-CM

## 2021-04-12 DIAGNOSIS — F51.4 ADULT NIGHT TERRORS: ICD-10-CM

## 2021-04-12 DIAGNOSIS — F41.0 GENERALIZED ANXIETY DISORDER WITH PANIC ATTACKS: ICD-10-CM

## 2021-04-12 PROCEDURE — 99214 OFFICE O/P EST MOD 30 MIN: CPT | Performed by: NURSE PRACTITIONER

## 2021-04-12 RX ORDER — ARIPIPRAZOLE 5 MG/1
5 TABLET ORAL DAILY
Qty: 30 TABLET | Refills: 1 | Status: SHIPPED | OUTPATIENT
Start: 2021-04-12 | End: 2021-05-10 | Stop reason: SDUPTHER

## 2021-04-12 RX ORDER — BUSPIRONE HYDROCHLORIDE 7.5 MG/1
7.5 TABLET ORAL 3 TIMES DAILY
Qty: 90 TABLET | Refills: 1 | Status: SHIPPED | OUTPATIENT
Start: 2021-04-12 | End: 2021-05-10

## 2021-04-12 RX ORDER — FLUOXETINE HYDROCHLORIDE 40 MG/1
80 CAPSULE ORAL DAILY
Qty: 60 CAPSULE | Refills: 1 | Status: SHIPPED | OUTPATIENT
Start: 2021-04-12 | End: 2021-05-10 | Stop reason: SDUPTHER
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
## 2021-04-13 ENCOUNTER — OFFICE VISIT (OUTPATIENT)
Dept: PSYCHIATRY | Facility: CLINIC | Age: 51
End: 2021-04-13

## 2021-04-13 VITALS — WEIGHT: 169 LBS | BODY MASS INDEX: 26.47 KG/M2

## 2021-04-13 DIAGNOSIS — F41.1 GENERALIZED ANXIETY DISORDER WITH PANIC ATTACKS: ICD-10-CM

## 2021-04-13 DIAGNOSIS — Z87.828 H/O MULTIPLE TRAUMA: ICD-10-CM

## 2021-04-13 DIAGNOSIS — F41.0 GENERALIZED ANXIETY DISORDER WITH PANIC ATTACKS: ICD-10-CM

## 2021-04-13 DIAGNOSIS — F33.2 SEVERE EPISODE OF RECURRENT MAJOR DEPRESSIVE DISORDER, WITHOUT PSYCHOTIC FEATURES (HCC): ICD-10-CM

## 2021-04-13 DIAGNOSIS — F43.10 PTSD (POST-TRAUMATIC STRESS DISORDER): Primary | ICD-10-CM

## 2021-04-13 DIAGNOSIS — R45.87 POOR IMPULSE CONTROL: ICD-10-CM

## 2021-04-13 PROCEDURE — 90837 PSYTX W PT 60 MINUTES: CPT | Performed by: COUNSELOR
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
## 2021-05-10 ENCOUNTER — OFFICE VISIT (OUTPATIENT)
Dept: PSYCHIATRY | Facility: CLINIC | Age: 51
End: 2021-05-10

## 2021-05-10 VITALS
DIASTOLIC BLOOD PRESSURE: 77 MMHG | HEIGHT: 67 IN | WEIGHT: 170 LBS | SYSTOLIC BLOOD PRESSURE: 120 MMHG | BODY MASS INDEX: 26.68 KG/M2 | HEART RATE: 77 BPM

## 2021-05-10 DIAGNOSIS — R45.4 ANGER: ICD-10-CM

## 2021-05-10 DIAGNOSIS — F41.0 GENERALIZED ANXIETY DISORDER WITH PANIC ATTACKS: ICD-10-CM

## 2021-05-10 DIAGNOSIS — F41.1 GENERALIZED ANXIETY DISORDER WITH PANIC ATTACKS: ICD-10-CM

## 2021-05-10 DIAGNOSIS — Z79.899 MEDICATION MANAGEMENT: Primary | ICD-10-CM

## 2021-05-10 DIAGNOSIS — F43.10 PTSD (POST-TRAUMATIC STRESS DISORDER): ICD-10-CM

## 2021-05-10 DIAGNOSIS — F33.2 SEVERE EPISODE OF RECURRENT MAJOR DEPRESSIVE DISORDER, WITHOUT PSYCHOTIC FEATURES (HCC): ICD-10-CM

## 2021-05-10 PROCEDURE — 99214 OFFICE O/P EST MOD 30 MIN: CPT | Performed by: NURSE PRACTITIONER

## 2021-05-10 RX ORDER — ARIPIPRAZOLE 5 MG/1
5 TABLET ORAL DAILY
Qty: 30 TABLET | Refills: 1 | Status: SHIPPED | OUTPATIENT
Start: 2021-05-10 | End: 2021-05-26 | Stop reason: SDUPTHER

## 2021-05-10 RX ORDER — ESTRADIOL 2 MG/1
TABLET ORAL
COMMUNITY
Start: 2021-04-14

## 2021-05-10 RX ORDER — FLUOXETINE HYDROCHLORIDE 40 MG/1
80 CAPSULE ORAL DAILY
Qty: 60 CAPSULE | Refills: 2 | Status: SHIPPED | OUTPATIENT
Start: 2021-05-10 | End: 2021-06-21 | Stop reason: SDUPTHER

## 2021-05-10 RX ORDER — BUSPIRONE HYDROCHLORIDE 10 MG/1
10 TABLET ORAL 3 TIMES DAILY
Qty: 90 TABLET | Refills: 2 | Status: SHIPPED | OUTPATIENT
Start: 2021-05-10 | End: 2021-06-21 | Stop reason: SDUPTHER

## 2021-05-10 RX ORDER — PROGESTERONE 200 MG/1
CAPSULE ORAL
COMMUNITY
Start: 2021-04-14

## 2021-05-11 ENCOUNTER — OFFICE VISIT (OUTPATIENT)
Dept: PSYCHIATRY | Facility: CLINIC | Age: 51
End: 2021-05-11

## 2021-05-11 DIAGNOSIS — F33.1 MAJOR DEPRESSIVE DISORDER, RECURRENT EPISODE, MODERATE (HCC): ICD-10-CM

## 2021-05-11 DIAGNOSIS — Z87.828 H/O MULTIPLE TRAUMA: ICD-10-CM

## 2021-05-11 DIAGNOSIS — F41.1 GENERALIZED ANXIETY DISORDER WITH PANIC ATTACKS: ICD-10-CM

## 2021-05-11 DIAGNOSIS — R45.87 POOR IMPULSE CONTROL: ICD-10-CM

## 2021-05-11 DIAGNOSIS — R45.4 ANGER: ICD-10-CM

## 2021-05-11 DIAGNOSIS — F41.0 GENERALIZED ANXIETY DISORDER WITH PANIC ATTACKS: ICD-10-CM

## 2021-05-11 DIAGNOSIS — F51.4 ADULT NIGHT TERRORS: ICD-10-CM

## 2021-05-11 DIAGNOSIS — F06.30 MENSTRUAL-RELATED MOOD DISORDER: ICD-10-CM

## 2021-05-11 DIAGNOSIS — F43.10 PTSD (POST-TRAUMATIC STRESS DISORDER): Primary | ICD-10-CM

## 2021-05-11 PROCEDURE — 90837 PSYTX W PT 60 MINUTES: CPT | Performed by: COUNSELOR

## 2021-05-11 RX ORDER — DIAZEPAM 5 MG/1
TABLET ORAL EVERY 12 HOURS SCHEDULED
COMMUNITY
Start: 2021-05-07 | End: 2021-06-06

## 2021-05-11 RX ORDER — ATENOLOL 25 MG/1
TABLET ORAL
COMMUNITY
Start: 2021-05-07

## 2021-05-11 RX ORDER — BUSPIRONE HYDROCHLORIDE 10 MG/1
TABLET ORAL EVERY 12 HOURS
COMMUNITY
End: 2021-06-21 | Stop reason: SDUPTHER

## 2021-05-11 RX ORDER — CYCLOBENZAPRINE HCL 10 MG
TABLET ORAL EVERY 12 HOURS
COMMUNITY
Start: 2021-05-07
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
## 2021-05-13 PROBLEM — J02.9 SORE THROAT: Status: ACTIVE | Noted: 2018-01-26

## 2021-05-13 PROBLEM — M50.30 DDD (DEGENERATIVE DISC DISEASE), CERVICAL: Status: ACTIVE | Noted: 2021-05-13

## 2021-05-13 PROBLEM — K59.04 CHRONIC IDIOPATHIC CONSTIPATION: Status: ACTIVE | Noted: 2021-05-13

## 2021-05-13 PROBLEM — H66.90 OTITIS MEDIA: Status: ACTIVE | Noted: 2018-01-26

## 2021-05-13 PROBLEM — F32.81 PREMENSTRUAL DYSPHORIC DISORDER: Status: RESOLVED | Noted: 2017-10-18 | Resolved: 2021-05-13

## 2021-05-13 PROBLEM — F32.81 PREMENSTRUAL DYSPHORIC DISORDER: Status: ACTIVE | Noted: 2017-10-18

## 2021-05-13 PROBLEM — J06.9 UPPER RESPIRATORY INFECTION: Status: ACTIVE | Noted: 2017-11-28

## 2021-05-25 ENCOUNTER — TELEPHONE (OUTPATIENT)
Dept: PSYCHIATRY | Facility: CLINIC | Age: 51
End: 2021-05-25

## 2021-05-25 NOTE — TELEPHONE ENCOUNTER
Glory's patient. Can you cover for her? Patient stated that she only has 6 Abilify left and she will be out of medication before her next refill is due on 5/10/21 and is wanting to know if more can be sent in to get her through to that date. She stated that she didn't know how she came up short.

## 2021-05-26 DIAGNOSIS — F33.2 SEVERE EPISODE OF RECURRENT MAJOR DEPRESSIVE DISORDER, WITHOUT PSYCHOTIC FEATURES (HCC): ICD-10-CM

## 2021-05-26 DIAGNOSIS — F41.0 GENERALIZED ANXIETY DISORDER WITH PANIC ATTACKS: ICD-10-CM

## 2021-05-26 DIAGNOSIS — F43.10 PTSD (POST-TRAUMATIC STRESS DISORDER): ICD-10-CM

## 2021-05-26 DIAGNOSIS — R45.4 ANGER: ICD-10-CM

## 2021-05-26 DIAGNOSIS — F41.1 GENERALIZED ANXIETY DISORDER WITH PANIC ATTACKS: ICD-10-CM

## 2021-05-26 RX ORDER — ARIPIPRAZOLE 5 MG/1
5 TABLET ORAL DAILY
Qty: 30 TABLET | Refills: 1 | Status: SHIPPED | OUTPATIENT
Start: 2021-05-26 | End: 2021-06-21 | Stop reason: SDUPTHER

## 2021-06-21 ENCOUNTER — OFFICE VISIT (OUTPATIENT)
Dept: PSYCHIATRY | Facility: CLINIC | Age: 51
End: 2021-06-21

## 2021-06-21 VITALS
SYSTOLIC BLOOD PRESSURE: 124 MMHG | HEIGHT: 67 IN | HEART RATE: 87 BPM | WEIGHT: 170 LBS | DIASTOLIC BLOOD PRESSURE: 80 MMHG | BODY MASS INDEX: 26.68 KG/M2

## 2021-06-21 DIAGNOSIS — Z79.899 MEDICATION MANAGEMENT: ICD-10-CM

## 2021-06-21 DIAGNOSIS — F41.1 GENERALIZED ANXIETY DISORDER WITH PANIC ATTACKS: ICD-10-CM

## 2021-06-21 DIAGNOSIS — G89.29 INSOMNIA SECONDARY TO CHRONIC PAIN: ICD-10-CM

## 2021-06-21 DIAGNOSIS — F33.1 MAJOR DEPRESSIVE DISORDER, RECURRENT EPISODE, MODERATE (HCC): ICD-10-CM

## 2021-06-21 DIAGNOSIS — F43.10 PTSD (POST-TRAUMATIC STRESS DISORDER): Primary | ICD-10-CM

## 2021-06-21 DIAGNOSIS — F33.2 SEVERE EPISODE OF RECURRENT MAJOR DEPRESSIVE DISORDER, WITHOUT PSYCHOTIC FEATURES (HCC): ICD-10-CM

## 2021-06-21 DIAGNOSIS — F41.0 GENERALIZED ANXIETY DISORDER WITH PANIC ATTACKS: ICD-10-CM

## 2021-06-21 DIAGNOSIS — G47.01 INSOMNIA SECONDARY TO CHRONIC PAIN: ICD-10-CM

## 2021-06-21 PROCEDURE — 99214 OFFICE O/P EST MOD 30 MIN: CPT | Performed by: NURSE PRACTITIONER

## 2021-06-21 RX ORDER — ARIPIPRAZOLE 5 MG/1
5 TABLET ORAL DAILY
Qty: 30 TABLET | Refills: 2 | Status: SHIPPED | OUTPATIENT
Start: 2021-06-21 | End: 2021-08-02 | Stop reason: SDUPTHER

## 2021-06-21 RX ORDER — HYDROXYZINE HYDROCHLORIDE 25 MG/1
25 TABLET, FILM COATED ORAL 2 TIMES DAILY PRN
Qty: 60 TABLET | Refills: 1 | Status: SHIPPED | OUTPATIENT
Start: 2021-06-21 | End: 2021-08-02 | Stop reason: SDUPTHER

## 2021-06-21 RX ORDER — BUSPIRONE HYDROCHLORIDE 15 MG/1
15 TABLET ORAL 3 TIMES DAILY
Qty: 60 TABLET | Refills: 2 | Status: SHIPPED | OUTPATIENT
Start: 2021-06-21 | End: 2021-08-02

## 2021-06-21 RX ORDER — FLUOXETINE HYDROCHLORIDE 40 MG/1
80 CAPSULE ORAL DAILY
Qty: 60 CAPSULE | Refills: 2 | Status: SHIPPED | OUTPATIENT
Start: 2021-06-21 | End: 2021-08-02 | Stop reason: SDUPTHER
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
## 2021-06-28 ENCOUNTER — OFFICE VISIT (OUTPATIENT)
Dept: PSYCHIATRY | Facility: CLINIC | Age: 51
End: 2021-06-28

## 2021-06-28 DIAGNOSIS — G89.29 INSOMNIA SECONDARY TO CHRONIC PAIN: ICD-10-CM

## 2021-06-28 DIAGNOSIS — G47.01 INSOMNIA SECONDARY TO CHRONIC PAIN: ICD-10-CM

## 2021-06-28 DIAGNOSIS — F41.1 GENERALIZED ANXIETY DISORDER WITH PANIC ATTACKS: ICD-10-CM

## 2021-06-28 DIAGNOSIS — F41.0 GENERALIZED ANXIETY DISORDER WITH PANIC ATTACKS: ICD-10-CM

## 2021-06-28 DIAGNOSIS — F43.10 PTSD (POST-TRAUMATIC STRESS DISORDER): Primary | ICD-10-CM

## 2021-06-28 DIAGNOSIS — Z87.828 H/O MULTIPLE TRAUMA: ICD-10-CM

## 2021-06-28 DIAGNOSIS — F06.30 MENSTRUAL-RELATED MOOD DISORDER: ICD-10-CM

## 2021-06-28 DIAGNOSIS — R45.87 POOR IMPULSE CONTROL: ICD-10-CM

## 2021-06-28 DIAGNOSIS — F33.1 MAJOR DEPRESSIVE DISORDER, RECURRENT EPISODE, MODERATE (HCC): ICD-10-CM

## 2021-06-28 DIAGNOSIS — F51.4 ADULT NIGHT TERRORS: ICD-10-CM

## 2021-06-28 PROCEDURE — 90837 PSYTX W PT 60 MINUTES: CPT | Performed by: COUNSELOR

## 2021-06-28 NOTE — TREATMENT PLAN
Multi-Disciplinary Problems (from Behavioral Health Treatment Plan)    Active Problems     Problem: Depression  Start Date: 03/16/21    Problem Details: The patient self-scales this problem as a 6 with 10 being the worst.      Goal Priority Start Date Expected End Date End Date    Patient will demonstrate the ability to initiate new constructive life skills outside of sessions on a consistent basis. -- 03/16/21 -- --    Goal Details: Progress toward goal:  Pt is working to identify coping skills and is taking medications as directed      Goal Intervention Frequency Start Date End Date    Assist patient in setting attainable activities of daily living goals. PRN 03/16/21 --    Goal Intervention Frequency Start Date End Date    Provide education about depression Monthy 03/16/21 --    Intervention Details: Duration of treatment until until remission of symptoms.        Goal Intervention Frequency Start Date End Date    Assist patient in developing healthy coping strategies. Monthy    03/16/21 --    Intervention Details: Duration of treatment until until remission of symptoms.              Problem: Post Traumatic Stress  Start Date: 03/16/21    Problem Details: The patient self-scales this problem as a 8.5 with 10 being the worst.        Goal Priority Start Date Expected End Date End Date    Patient will process and move through trauma in a way that improves self regard and the patients ability to function optimally in the world around them. -- 03/16/21 -- --    Goal Details: Progress toward goal:  Pt indicates PTSD impacts her life every day but that the medications make it more tolerable.      Goal Intervention Frequency Start Date End Date    Assist patient in identifying ways that trauma has negatively impacted their view of themselves and the world. Monthy    03/16/21 --    Intervention Details: Duration of treatment until until remission of symptoms.        Goal Intervention Frequency Start Date End Date     Process trauma in the context of the safe session environment. Monthy 03/16/21 --    Intervention Details: Duration of treatment until until remission of symptoms.        Goal Intervention Frequency Start Date End Date    Develop a plan of behavior changes that will reduce the stress of the trauma. Monthy    03/16/21 --    Intervention Details: Duration of treatment until until remission of symptoms.                           I have discussed and reviewed this treatment plan with the patient.  It has been printed for signatures.   .
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
## 2021-07-28 ENCOUNTER — OFFICE VISIT (OUTPATIENT)
Dept: PSYCHIATRY | Facility: CLINIC | Age: 51
End: 2021-07-28

## 2021-07-28 DIAGNOSIS — F43.10 PTSD (POST-TRAUMATIC STRESS DISORDER): Primary | ICD-10-CM

## 2021-07-28 DIAGNOSIS — F41.1 GENERALIZED ANXIETY DISORDER WITH PANIC ATTACKS: ICD-10-CM

## 2021-07-28 DIAGNOSIS — G47.01 INSOMNIA SECONDARY TO CHRONIC PAIN: ICD-10-CM

## 2021-07-28 DIAGNOSIS — F06.30 MENSTRUAL-RELATED MOOD DISORDER: ICD-10-CM

## 2021-07-28 DIAGNOSIS — G89.29 INSOMNIA SECONDARY TO CHRONIC PAIN: ICD-10-CM

## 2021-07-28 DIAGNOSIS — R45.4 ANGER: ICD-10-CM

## 2021-07-28 DIAGNOSIS — F41.0 GENERALIZED ANXIETY DISORDER WITH PANIC ATTACKS: ICD-10-CM

## 2021-07-28 DIAGNOSIS — F33.1 MAJOR DEPRESSIVE DISORDER, RECURRENT EPISODE, MODERATE (HCC): ICD-10-CM

## 2021-07-28 DIAGNOSIS — Z87.828 H/O MULTIPLE TRAUMA: ICD-10-CM

## 2021-07-28 PROCEDURE — 90837 PSYTX W PT 60 MINUTES: CPT | Performed by: COUNSELOR

## 2021-07-28 RX ORDER — BUSPIRONE HYDROCHLORIDE 10 MG/1
TABLET ORAL
COMMUNITY
Start: 2021-07-05 | End: 2021-07-28 | Stop reason: DRUGHIGH

## 2021-07-28 RX ORDER — VALSARTAN 80 MG/1
TABLET ORAL
COMMUNITY
Start: 2021-07-05
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
## 2021-07-28 NOTE — PROGRESS NOTES
[facility]    Dian Tanner 1970 07/28/21    Instructions: The following questions ask about thoughts, feelings, and behaviors about panic attacks. A panic attack is an episode of intense fear that sometimes comes out of the blue (for no apparent reason). The symptoms of a panic attack include: a racing heart, shortness of breath, dizziness, sweating, and fear of losing control or dying. Please respond to each item by marking (? or x) one box per row.     Clinician  Use    During the PAST 7 DAYS, I have… Never Occasionally Half of  the time Most of  the time All of the  time Item  score     1. felt moments of sudden terror, fear or fright, sometimes out of the blue (i.e., a panic attack)   []  0   [x]  1   []2   []3   []  4   1   2. felt anxious, worried, or nervous about having more panic attacks   []  0   []  1   [x]2   []3   []  4    2     3. had thoughts of losing control, dying, going crazy, or other bad things happening  because of panic attacks   []  0   []  1   [x]2   []3   []  4   2   4. felt a racing heart, sweaty, trouble  breathing, faint, or shaky   []  0   [x]  1   []2   []3   []  4   1   5. felt tense muscles, felt on edge or restless, or had trouble relaxing or trouble sleeping   []  0   [x]  1   []2   []3   []  4   1     6. avoided, or did not approach or enter, situations in which panic attacks might occur   []  0   []  1   []2   []3   [x]  4   4     7. left situations early, or participated only minimally, because of panic attacks   []  0   []  1   []2   []3   [x]  4   4     8. spent a lot of time preparing for, or procrastinating about (putting off), situations in which panic attacks might occur   []  0   []  1   []2   []3   [x]  4   4   9. distracted myself to avoid thinking about panic attacks   []  0   []  1   []2   [x]3   []  4   3     10. needed help to cope with panic attacks (e.g., alcohol or medication, superstitious  objects, other people)    []  0   []  1   []2   []3   [x]  4   4   Total/Partial Raw Score: 23   Prorated Total Raw Score: (if 1-2 items left unanswered)    Average Total Score: (2.6)  3   Antoinette MELGOZA, Camryn BREWER, Gaby S, Marcio M, Bright G, Ja BARRAZA. Copyright © 2013 [employer]. All rights reserved. This material can be reproduced without permission by researchers and by clinicians for use with their patients.

## 2021-08-02 ENCOUNTER — OFFICE VISIT (OUTPATIENT)
Dept: PSYCHIATRY | Facility: CLINIC | Age: 51
End: 2021-08-02

## 2021-08-02 VITALS
BODY MASS INDEX: 25.27 KG/M2 | HEART RATE: 64 BPM | SYSTOLIC BLOOD PRESSURE: 121 MMHG | WEIGHT: 161 LBS | DIASTOLIC BLOOD PRESSURE: 76 MMHG | HEIGHT: 67 IN

## 2021-08-02 DIAGNOSIS — G47.01 INSOMNIA SECONDARY TO CHRONIC PAIN: ICD-10-CM

## 2021-08-02 DIAGNOSIS — F33.2 SEVERE EPISODE OF RECURRENT MAJOR DEPRESSIVE DISORDER, WITHOUT PSYCHOTIC FEATURES (HCC): ICD-10-CM

## 2021-08-02 DIAGNOSIS — F33.1 MAJOR DEPRESSIVE DISORDER, RECURRENT EPISODE, MODERATE (HCC): ICD-10-CM

## 2021-08-02 DIAGNOSIS — G89.29 INSOMNIA SECONDARY TO CHRONIC PAIN: ICD-10-CM

## 2021-08-02 DIAGNOSIS — Z79.899 MEDICATION MANAGEMENT: ICD-10-CM

## 2021-08-02 DIAGNOSIS — F41.1 GENERALIZED ANXIETY DISORDER WITH PANIC ATTACKS: ICD-10-CM

## 2021-08-02 DIAGNOSIS — F43.10 PTSD (POST-TRAUMATIC STRESS DISORDER): Primary | ICD-10-CM

## 2021-08-02 DIAGNOSIS — F41.0 GENERALIZED ANXIETY DISORDER WITH PANIC ATTACKS: ICD-10-CM

## 2021-08-02 PROCEDURE — 99214 OFFICE O/P EST MOD 30 MIN: CPT | Performed by: NURSE PRACTITIONER

## 2021-08-02 RX ORDER — HYDROXYZINE HYDROCHLORIDE 25 MG/1
25 TABLET, FILM COATED ORAL 2 TIMES DAILY PRN
Qty: 60 TABLET | Refills: 1 | Status: SHIPPED | OUTPATIENT
Start: 2021-08-02 | End: 2021-10-15 | Stop reason: SDUPTHER

## 2021-08-02 RX ORDER — BUSPIRONE HYDROCHLORIDE 15 MG/1
15 TABLET ORAL 3 TIMES DAILY
Qty: 90 TABLET | Refills: 2 | Status: SHIPPED | OUTPATIENT
Start: 2021-08-02 | End: 2021-10-15 | Stop reason: SDUPTHER

## 2021-08-02 RX ORDER — ARIPIPRAZOLE 5 MG/1
5 TABLET ORAL DAILY
Qty: 30 TABLET | Refills: 2 | Status: SHIPPED | OUTPATIENT
Start: 2021-08-02 | End: 2021-10-15 | Stop reason: SDUPTHER

## 2021-08-02 RX ORDER — FLUOXETINE HYDROCHLORIDE 40 MG/1
80 CAPSULE ORAL DAILY
Qty: 60 CAPSULE | Refills: 2 | Status: SHIPPED | OUTPATIENT
Start: 2021-08-02 | End: 2021-10-15 | Stop reason: SDUPTHER

## 2021-08-02 NOTE — PROGRESS NOTES
"Chief Complaint  Depression, anxiety, panic/trauma symptoms  Subjective    Dian Tanner presents to [facility] for her 6-week follow up  for  medication management     History of Present Illness patient reports improvement in insomnia anxiety and depression symptoms with medication.  Average sleep duration 5-6 hours.  Reports having 1 night terror since her last appointment.  She has no memory of this.  tells her she woke at screaming \"get out\".  Good appetite and continues to eat low carb diet.  Does not drink soda.  9 pound weight loss since 6/2021.  Rates anxiety as 2/10, depression is 0/10 on 0-10 scale with 10 being the worst.  Denies SI/HI/AVH. states she is going through some stressful times with her son not working.  The medication helps her better manage stress    Objective   Vital Signs:   /76   Pulse 64   Ht 170.2 cm (67.01\")   Wt 73 kg (161 lb)   BMI 25.21 kg/m²       Patient screened positive for depression based on a PHQ-9 score of 7 on 7/28/2021. Follow-up recommendations include: Prescribed antidepressant medication treatment.  Appearance- Dian   is a 50-year-old  female.  Appears clean, Hair styled, wearing make-up, appropriately dressed.  Appears appropriate for stated age. BMI 25.21 No indication of impairment or acute distress.    Gait, Station, strength-posture upright, gait steady.  No signs of impairment, acute distress, abnormal muscle movements, motor tics or tremors    Mental Status Exam:   Hygiene:   good  Cooperation:  Cooperative, engaged  Eye Contact:  Good  Psychomotor Behavior:  Appropriate  Affect:  Appropriate  Mood:  normal  Speech:  Monotone  Thought Process:  Goal directed and Linear  Thought Content:  Normal   Suicidal:  None  Homicidal:  None  Hallucinations:  None  Delusion:  None  Memory:  Intact  Orientation:  Person, Place, Time and Situation  Reliability:  good  Insight:  Good  Judgement:  Fair  Impulse " Control:  Fair  Physical/Medical Issues:  HTN, arthritis, chronic back pain     Current Medications:   Current Outpatient Medications   Medication Sig Dispense Refill   • ARIPiprazole (Abilify) 5 MG tablet Take 1 tablet by mouth Daily. 30 tablet 2   • atenolol (TENORMIN) 25 MG tablet take 1/2-1  tablet by oral route  every day     • busPIRone (BUSPAR) 15 MG tablet Take 1 tablet by mouth 3 (Three) Times a Day. Take either always with food or always without 60 tablet 2   • cyclobenzaprine (FLEXERIL) 10 MG tablet Take  by mouth Every 12 (Twelve) Hours.     • diazePAM (VALIUM) 5 MG tablet      • estradiol (ESTRACE) 2 MG tablet      • FLUoxetine (PROzac) 40 MG capsule Take 2 capsules by mouth Daily. 60 capsule 2   • gabapentin (NEURONTIN) 300 MG capsule      • HYDROcodone-acetaminophen (NORCO) 5-325 MG per tablet      • hydrOXYzine (ATARAX) 25 MG tablet Take 1 tablet by mouth 2 (Two) Times a Day As Needed for Anxiety (insomnia). 60 tablet 1   • ibuprofen (ADVIL,MOTRIN) 600 MG tablet      • latanoprost (XALATAN) 0.005 % ophthalmic solution      • Linzess 145 MCG capsule capsule      • loratadine (CLARITIN) 10 MG tablet Daily As Needed.     • omeprazole (priLOSEC) 20 MG capsule      • ondansetron (ZOFRAN) 8 MG tablet Take  by mouth Every 8 (Eight) Hours As Needed for Nausea or Vomiting.     • Progesterone (PROMETRIUM) 200 MG capsule      • valsartan (DIOVAN) 80 MG tablet        No current facility-administered medications for this visit.     Physical Exam  Vitals reviewed.   Constitutional:       Appearance: Normal appearance. She is well-groomed and normal weight.   Neurological:      General: No focal deficit present.      Mental Status: She is alert and oriented to person, place, and time.      Gait: Gait is intact.   Psychiatric:         Attention and Perception: Attention and perception normal.         Mood and Affect: Mood normal.         Behavior: Behavior normal. Behavior is cooperative.         Thought Content:  Thought content normal. Thought content does not include homicidal or suicidal ideation.         Cognition and Memory: She exhibits impaired remote memory.      Comments: general distrust of strangers.         .Review of Systems   Musculoskeletal: Positive for arthralgias.   Psychiatric/Behavioral: Positive for sleep disturbance. Negative for self-injury and suicidal ideas. The patient is nervous/anxious.    All other systems reviewed and are negative.    Assessment/plan  .Diagnoses and all orders for this visit:    1. PTSD (post-traumatic stress disorder) (Primary)  -     ARIPiprazole (Abilify) 5 MG tablet; Take 1 tablet by mouth Daily.  Dispense: 30 tablet; Refill: 2  -     busPIRone (BUSPAR) 15 MG tablet; Take 1 tablet by mouth 3 (Three) Times a Day. Take either always with food or always without  Dispense: 90 tablet; Refill: 2  -     FLUoxetine (PROzac) 40 MG capsule; Take 2 capsules by mouth Daily.  Dispense: 60 capsule; Refill: 2    2. Major depressive disorder, recurrent episode, moderate (CMS/HCC)  -     ARIPiprazole (Abilify) 5 MG tablet; Take 1 tablet by mouth Daily.  Dispense: 30 tablet; Refill: 2  -     busPIRone (BUSPAR) 15 MG tablet; Take 1 tablet by mouth 3 (Three) Times a Day. Take either always with food or always without  Dispense: 90 tablet; Refill: 2  -     FLUoxetine (PROzac) 40 MG capsule; Take 2 capsules by mouth Daily.  Dispense: 60 capsule; Refill: 2    3. Generalized anxiety disorder with panic attacks  -     ARIPiprazole (Abilify) 5 MG tablet; Take 1 tablet by mouth Daily.  Dispense: 30 tablet; Refill: 2  -     busPIRone (BUSPAR) 15 MG tablet; Take 1 tablet by mouth 3 (Three) Times a Day. Take either always with food or always without  Dispense: 90 tablet; Refill: 2  -     FLUoxetine (PROzac) 40 MG capsule; Take 2 capsules by mouth Daily.  Dispense: 60 capsule; Refill: 2    4. Insomnia secondary to chronic pain  -     hydrOXYzine (ATARAX) 25 MG tablet; Take 1 tablet by mouth 2 (Two) Times  a Day As Needed for Anxiety (insomnia).  Dispense: 60 tablet; Refill: 1    5. Medication management    6. Severe episode of recurrent major depressive disorder, without psychotic features (CMS/HCC)    Takes medication as prescribed and denies medication side effects.  Believes increasing frequency of Buspar last visit has improved anxiety.  Atarax helpful as needed for anxiety during the day.  Usually takes Valium once daily.  Feels medications are effective and prefers no changes be made at this time.  AIMS conducted.  No abnormal muscle movements observed.  Psychotherapy is helpful .    -Continue Prozac 40 mg - 2 capsules daily for symptoms of anxiety and depression  -Continue BuSpar 15 mg 3 times daily for symptoms of anxiety.  Patient advised to always take medication with food or without food to increase absorption  -Continue Abilify to 5 mg daily for symptoms of agitation and to help augment Prozac for depression  -Hydroxyzine 25 mg twice daily as needed for anxiety and insomnia.  Aware to avoid use with Claritin    -Patient encouraged to continue psychotherapy  -Patient encouraged to continue exercising and eating healthy  -Dian will need labs in 1-2 months to monitor for possible metabolic side effects from Abilify  -Dian is a non-smoker  Reports reviewed include Gregg report, notes    TREATMENT PLAN/GOALS:    Short Term  1. Pt will keep all appointments for med mgt and psychotherapy  2. Pt will take medications as prescribed and report intolerable side effects  3. Pt will pursue psychotherapy services to help gain coping skill and process trauma    Long term:   1. Pt will exhibit emotional stability  2. Pt will use coping skills to work through depression  3. Pt will manage feeling of anger using anger management strategies    MEDICATION ISSUES:  We discussed risks, benefits, and side effects of the above medications and the patient was agreeable with the plan. Patient was educated on the importance  of compliance with treatment and follow-up appointments.  Patient is agreeable to call the office with any worsening of symptoms or onset of side effects. Patient is agreeable to call 911 or go to the nearest ER should she begin having SI/HI.     MEDS ORDERED DURING VISIT:  New Medications Ordered This Visit   Medications   • ARIPiprazole (Abilify) 5 MG tablet     Sig: Take 1 tablet by mouth Daily.     Dispense:  30 tablet     Refill:  2   • busPIRone (BUSPAR) 15 MG tablet     Sig: Take 1 tablet by mouth 3 (Three) Times a Day. Take either always with food or always without     Dispense:  90 tablet     Refill:  2   • FLUoxetine (PROzac) 40 MG capsule     Sig: Take 2 capsules by mouth Daily.     Dispense:  60 capsule     Refill:  2   • hydrOXYzine (ATARAX) 25 MG tablet     Sig: Take 1 tablet by mouth 2 (Two) Times a Day As Needed for Anxiety (insomnia).     Dispense:  60 tablet     Refill:  1     Follow Up   Return in about 6 weeks (around 9/13/2021).    This document has been electronically signed by DELVIS Fox  August 2, 2021 15:12 EDT    Part of this note may be an electronic transcription/translation of spoken language to printed text using the Dragon Dictation System.

## 2021-08-30 ENCOUNTER — OFFICE VISIT (OUTPATIENT)
Dept: PSYCHIATRY | Facility: CLINIC | Age: 51
End: 2021-08-30

## 2021-08-30 DIAGNOSIS — F33.1 MAJOR DEPRESSIVE DISORDER, RECURRENT EPISODE, MODERATE (HCC): ICD-10-CM

## 2021-08-30 DIAGNOSIS — G47.01 INSOMNIA SECONDARY TO CHRONIC PAIN: ICD-10-CM

## 2021-08-30 DIAGNOSIS — F51.4 ADULT NIGHT TERRORS: ICD-10-CM

## 2021-08-30 DIAGNOSIS — F32.81 PMDD (PREMENSTRUAL DYSPHORIC DISORDER): ICD-10-CM

## 2021-08-30 DIAGNOSIS — R45.4 ANGER: ICD-10-CM

## 2021-08-30 DIAGNOSIS — Z87.828 H/O MULTIPLE TRAUMA: ICD-10-CM

## 2021-08-30 DIAGNOSIS — G89.29 INSOMNIA SECONDARY TO CHRONIC PAIN: ICD-10-CM

## 2021-08-30 DIAGNOSIS — F43.10 PTSD (POST-TRAUMATIC STRESS DISORDER): Primary | ICD-10-CM

## 2021-08-30 DIAGNOSIS — F41.1 GENERALIZED ANXIETY DISORDER: ICD-10-CM

## 2021-08-30 PROBLEM — I10 BENIGN ESSENTIAL HYPERTENSION: Status: ACTIVE | Noted: 2021-06-16

## 2021-08-30 PROCEDURE — 90837 PSYTX W PT 60 MINUTES: CPT | Performed by: COUNSELOR
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
## 2021-10-15 ENCOUNTER — OFFICE VISIT (OUTPATIENT)
Dept: PSYCHIATRY | Facility: CLINIC | Age: 51
End: 2021-10-15

## 2021-10-15 VITALS
HEART RATE: 71 BPM | HEIGHT: 67 IN | SYSTOLIC BLOOD PRESSURE: 114 MMHG | BODY MASS INDEX: 25.43 KG/M2 | DIASTOLIC BLOOD PRESSURE: 77 MMHG | WEIGHT: 162 LBS

## 2021-10-15 DIAGNOSIS — F41.0 GENERALIZED ANXIETY DISORDER WITH PANIC ATTACKS: ICD-10-CM

## 2021-10-15 DIAGNOSIS — G47.01 INSOMNIA SECONDARY TO CHRONIC PAIN: ICD-10-CM

## 2021-10-15 DIAGNOSIS — Z79.899 MEDICATION MANAGEMENT: ICD-10-CM

## 2021-10-15 DIAGNOSIS — F33.0 MDD (MAJOR DEPRESSIVE DISORDER), RECURRENT EPISODE, MILD (HCC): ICD-10-CM

## 2021-10-15 DIAGNOSIS — F41.1 GENERALIZED ANXIETY DISORDER WITH PANIC ATTACKS: ICD-10-CM

## 2021-10-15 DIAGNOSIS — G89.29 INSOMNIA SECONDARY TO CHRONIC PAIN: ICD-10-CM

## 2021-10-15 DIAGNOSIS — F43.10 PTSD (POST-TRAUMATIC STRESS DISORDER): Primary | ICD-10-CM

## 2021-10-15 PROCEDURE — 99214 OFFICE O/P EST MOD 30 MIN: CPT | Performed by: NURSE PRACTITIONER

## 2021-10-15 RX ORDER — HYDROXYZINE HYDROCHLORIDE 25 MG/1
25 TABLET, FILM COATED ORAL 2 TIMES DAILY PRN
Qty: 60 TABLET | Refills: 1 | Status: SHIPPED | OUTPATIENT
Start: 2021-10-15 | End: 2021-12-10 | Stop reason: SDUPTHER

## 2021-10-15 RX ORDER — FLUOXETINE HYDROCHLORIDE 40 MG/1
80 CAPSULE ORAL DAILY
Qty: 60 CAPSULE | Refills: 1 | Status: SHIPPED | OUTPATIENT
Start: 2021-10-15 | End: 2021-12-10 | Stop reason: SDUPTHER

## 2021-10-15 RX ORDER — BUSPIRONE HYDROCHLORIDE 15 MG/1
15 TABLET ORAL 3 TIMES DAILY
Qty: 90 TABLET | Refills: 1 | Status: SHIPPED | OUTPATIENT
Start: 2021-10-15 | End: 2021-12-10 | Stop reason: DRUGHIGH

## 2021-10-15 RX ORDER — ARIPIPRAZOLE 5 MG/1
5 TABLET ORAL DAILY
Qty: 30 TABLET | Refills: 1 | Status: SHIPPED | OUTPATIENT
Start: 2021-10-15 | End: 2021-12-10 | Stop reason: SDUPTHER

## 2021-10-15 NOTE — PROGRESS NOTES
"Chief Complaint   anxiety, panic/trauma symptoms    Subjective    Dian Tanner presents to [facility] for her follow up  for  medication management     History of Present Illness Patient  had 5 teeth cut out yesterday is having some mouth pain.  She is doing well otherwise.  Reports improvement in insomnia, anxiety, depression.  Sleep quality \"pretty good.\"    been continues to tell her she fights in her sleep almost every night and has full conversations with him she has no memory of.  Average sleep duration 7 hours.  Appetite is good.  She has been on a diet and believes she has lost weight.  Reports feeling slight depression (2/10) \"only  when I sit and think about  Things.\" Experiences situational  exacerbations of anxiety 3-4 times per week.  Rates anxiety as 5-6/10 with 10 being the worst.  Reports 2 panic episodes since her last visit.  One was when her son went to [place] and did not tell her where he was going or whom he was traveling with.  States she has been distracting herself by making fall and [place] baskets.        Objective   Vital Signs:   /77   Pulse 71   Ht 170.2 cm (67.01\")   Wt 73.5 kg (162 lb)   BMI 25.37 kg/m²       Patient screened positive for depression based on a PHQ-9 score of 7 on 10/15/2021. Follow-up recommendations include: Prescribed antidepressant medication treatment.    Appearance- Dian   is a 50-year-old  female.  Appears neatly dressed and clean. Hair pulled up, wearing make-up, appropriately dressed wearing jeans, long sleeve shirt. Appears appropriate for stated age. BMI 25.37   .    Gait, Station, strength-posture upright, gait steady.  No signs of impairment, acute distress, abnormal muscle movements, motor tics or tremors    Mental Status Exam:   Hygiene:   good  Cooperation:  Cooperative, engaged  Eye Contact:  Good  Psychomotor Behavior:  Appropriate  Affect:  Appropriate  Mood:  normal  Speech:  " Normal  Thought Process:  Goal directed and Linear  Thought Content:  Normal and optimistic (feels like she is getting better)  Suicidal:  None  Homicidal:  None  Hallucinations:  None  Delusion:  None  Memory:  Intact  Orientation:  Person, Place, Time and Situation  Reliability:  good  Insight:  Good  Judgement:  Fair  Impulse Control:  Fair  Physical/Medical Issues:  HTN, arthritis, chronic back pain     Current Medications:   Current Outpatient Medications   Medication Sig Dispense Refill   • ARIPiprazole (Abilify) 5 MG tablet Take 1 tablet by mouth Daily. 30 tablet 2   • atenolol (TENORMIN) 25 MG tablet take 1/2-1  tablet by oral route  every day     • busPIRone (BUSPAR) 15 MG tablet Take 1 tablet by mouth 3 (Three) Times a Day. Take either always with food or always without 90 tablet 2   • cyclobenzaprine (FLEXERIL) 10 MG tablet Take  by mouth Every 12 (Twelve) Hours.     • diazePAM (VALIUM) 5 MG tablet      • estradiol (ESTRACE) 2 MG tablet      • FLUoxetine (PROzac) 40 MG capsule Take 2 capsules by mouth Daily. 60 capsule 2   • gabapentin (NEURONTIN) 300 MG capsule      • HYDROcodone-acetaminophen (NORCO) 5-325 MG per tablet      • hydrOXYzine (ATARAX) 25 MG tablet Take 1 tablet by mouth 2 (Two) Times a Day As Needed for Anxiety (insomnia). 60 tablet 1   • ibuprofen (ADVIL,MOTRIN) 600 MG tablet      • latanoprost (XALATAN) 0.005 % ophthalmic solution      • Linzess 145 MCG capsule capsule      • loratadine (CLARITIN) 10 MG tablet Daily As Needed.     • omeprazole (priLOSEC) 20 MG capsule      • Progesterone (PROMETRIUM) 200 MG capsule      • ondansetron (ZOFRAN) 8 MG tablet Take  by mouth Every 8 (Eight) Hours As Needed for Nausea or Vomiting.     • valsartan (DIOVAN) 80 MG tablet        No current facility-administered medications for this visit.     Physical Exam  Vitals reviewed.   Constitutional:       Appearance: Normal appearance. She is well-groomed and normal weight.   Neurological:      General: No  focal deficit present.      Mental Status: She is alert and oriented to person, place, and time.      Gait: Gait is intact.   Psychiatric:         Attention and Perception: Attention and perception normal.         Mood and Affect: Mood normal.         Behavior: Behavior normal. Behavior is cooperative.         Thought Content: Thought content normal. Thought content does not include homicidal or suicidal ideation.         Cognition and Memory: She exhibits impaired remote memory.      Comments: general distrust of strangers.         .Review of Systems   Constitutional: Negative.    HENT: Positive for dental problem.    Musculoskeletal: Positive for arthralgias.   Psychiatric/Behavioral: Positive for sleep disturbance. Negative for self-injury and suicidal ideas. The patient is nervous/anxious.    All other systems reviewed and are negative.    Assessment/plan  .Diagnoses and all orders for this visit:    1. PTSD (post-traumatic stress disorder) (Primary)  -     ARIPiprazole (Abilify) 5 MG tablet; Take 1 tablet by mouth Daily.  Dispense: 30 tablet; Refill: 1  -     busPIRone (BUSPAR) 15 MG tablet; Take 1 tablet by mouth 3 (Three) Times a Day. Take either always with food or always without  Dispense: 90 tablet; Refill: 1  -     FLUoxetine (PROzac) 40 MG capsule; Take 2 capsules by mouth Daily.  Dispense: 60 capsule; Refill: 1    2. Generalized anxiety disorder with panic attacks  Comments:  Prescribed Valium 5 mg tab  #60, 30 days Tamela Gabe dispensed 9/27/2021  Orders:  -     ARIPiprazole (Abilify) 5 MG tablet; Take 1 tablet by mouth Daily.  Dispense: 30 tablet; Refill: 1  -     busPIRone (BUSPAR) 15 MG tablet; Take 1 tablet by mouth 3 (Three) Times a Day. Take either always with food or always without  Dispense: 90 tablet; Refill: 1  -     FLUoxetine (PROzac) 40 MG capsule; Take 2 capsules by mouth Daily.  Dispense: 60 capsule; Refill: 1    3. MDD (major depressive disorder), recurrent episode, mild (HCC)  -      ARIPiprazole (Abilify) 5 MG tablet; Take 1 tablet by mouth Daily.  Dispense: 30 tablet; Refill: 1  -     busPIRone (BUSPAR) 15 MG tablet; Take 1 tablet by mouth 3 (Three) Times a Day. Take either always with food or always without  Dispense: 90 tablet; Refill: 1  -     FLUoxetine (PROzac) 40 MG capsule; Take 2 capsules by mouth Daily.  Dispense: 60 capsule; Refill: 1    4. Insomnia secondary to chronic pain-improving  Comments:  Prescribed Valium 5 mg tab  #60, 30 days Tamela Bernal dispensed 9/27/2021  Orders:  -     hydrOXYzine (ATARAX) 25 MG tablet; Take 1 tablet by mouth 2 (Two) Times a Day As Needed for Anxiety (insomnia).  Dispense: 60 tablet; Refill: 1    5. Medication management    Patient states she takes medication as prescribed and denies medication side effects.  Reports improvement in insomnia, anxiety and depression.  S She has been taking the hydroxyzine twice daily finds it very helpful for managing anxiety.  Estimates she takes Valium approximately 4 times per week.  Discussed risk of dependency with use of benzodiazepines encouraged her to use Valium judiciously . She feels her medication regimen is effective and prefers no changes be made at this time.      -Continue Prozac 40 mg - 2 capsules daily for symptoms of anxiety and depression  -Continue BuSpar 15 mg 3 times daily for symptoms of anxiety.  Patient advised to always take medication with food or without food to increase absorption  -Continue Abilify to 5 mg daily for symptoms of agitation and to help augment Prozac for depression  -Continue Hydroxyzine 25 mg twice daily as needed for anxiety and insomnia.  Aware to avoid use with Claritin    -Patient encouraged to continue psychotherapy  -Patient encouraged to continue exercising and eating healthy  -Dian will need labs in 1-2 months to monitor for possible metabolic side effects from Abilify  -Dian is a non-smoker  Reports reviewed include [facility]gg report, notes    TREATMENT  PLAN/GOALS:    Short Term  1. Pt will keep all appointments for med mgt and psychotherapy  2. Pt will take medications as prescribed and report intolerable side effects  3. Pt will continue psychotherapy services to help gain coping skill and process trauma    Long term:   1. Pt will exhibit emotional stability  2. Pt will use coping skills to work through depression  3. Pt will manage feeling of anger using anger management strategies    MEDICATION ISSUES:  We discussed risks, benefits, and side effects of the above medications and the patient was agreeable with the plan. Patient was educated on the importance of compliance with treatment and follow-up appointments.  Patient is agreeable to call the office with any worsening of symptoms or onset of side effects. Patient is agreeable to call 911 or go to the nearest ER should she begin having SI/HI.     MEDS ORDERED DURING VISIT:  New Medications Ordered This Visit   Medications   • ARIPiprazole (Abilify) 5 MG tablet     Sig: Take 1 tablet by mouth Daily.     Dispense:  30 tablet     Refill:  1   • busPIRone (BUSPAR) 15 MG tablet     Sig: Take 1 tablet by mouth 3 (Three) Times a Day. Take either always with food or always without     Dispense:  90 tablet     Refill:  1   • FLUoxetine (PROzac) 40 MG capsule     Sig: Take 2 capsules by mouth Daily.     Dispense:  60 capsule     Refill:  1   • hydrOXYzine (ATARAX) 25 MG tablet     Sig: Take 1 tablet by mouth 2 (Two) Times a Day As Needed for Anxiety (insomnia).     Dispense:  60 tablet     Refill:  1     Follow Up   Return in about 2 months (around 12/15/2021).    This document has been electronically signed by DELVIS Fox  October 15, 2021 12:31 EDT    Part of this note may be an electronic transcription/translation of spoken language to printed text using the Dragon Dictation System.

## 2021-12-10 ENCOUNTER — OFFICE VISIT (OUTPATIENT)
Dept: PSYCHIATRY | Facility: CLINIC | Age: 51
End: 2021-12-10

## 2021-12-10 VITALS
WEIGHT: 162 LBS | SYSTOLIC BLOOD PRESSURE: 130 MMHG | DIASTOLIC BLOOD PRESSURE: 82 MMHG | HEART RATE: 83 BPM | HEIGHT: 67 IN | BODY MASS INDEX: 25.43 KG/M2

## 2021-12-10 DIAGNOSIS — F41.1 GENERALIZED ANXIETY DISORDER WITH PANIC ATTACKS: ICD-10-CM

## 2021-12-10 DIAGNOSIS — F41.0 GENERALIZED ANXIETY DISORDER WITH PANIC ATTACKS: ICD-10-CM

## 2021-12-10 DIAGNOSIS — F33.0 MDD (MAJOR DEPRESSIVE DISORDER), RECURRENT EPISODE, MILD (HCC): ICD-10-CM

## 2021-12-10 DIAGNOSIS — G47.01 INSOMNIA SECONDARY TO CHRONIC PAIN: ICD-10-CM

## 2021-12-10 DIAGNOSIS — F43.10 PTSD (POST-TRAUMATIC STRESS DISORDER): Primary | ICD-10-CM

## 2021-12-10 DIAGNOSIS — G89.29 INSOMNIA SECONDARY TO CHRONIC PAIN: ICD-10-CM

## 2021-12-10 DIAGNOSIS — Z79.899 MEDICATION MANAGEMENT: ICD-10-CM

## 2021-12-10 PROCEDURE — 99214 OFFICE O/P EST MOD 30 MIN: CPT | Performed by: NURSE PRACTITIONER

## 2021-12-10 RX ORDER — BUSPIRONE HYDROCHLORIDE 10 MG/1
1 TABLET ORAL 3 TIMES DAILY
COMMUNITY
End: 2021-12-10 | Stop reason: SDUPTHER

## 2021-12-10 RX ORDER — BUSPIRONE HYDROCHLORIDE 10 MG/1
10 TABLET ORAL 3 TIMES DAILY
Qty: 90 TABLET | Refills: 1 | Status: SHIPPED | OUTPATIENT
Start: 2021-12-10 | End: 2022-04-25 | Stop reason: SDUPTHER

## 2021-12-10 RX ORDER — HYDROXYZINE HYDROCHLORIDE 25 MG/1
25 TABLET, FILM COATED ORAL 2 TIMES DAILY PRN
Qty: 60 TABLET | Refills: 1 | Status: SHIPPED | OUTPATIENT
Start: 2021-12-10 | End: 2022-02-23 | Stop reason: SDUPTHER

## 2021-12-10 RX ORDER — FLUOXETINE HYDROCHLORIDE 20 MG/1
4 CAPSULE ORAL
COMMUNITY
Start: 2021-10-08 | End: 2021-12-10 | Stop reason: CLARIF

## 2021-12-10 RX ORDER — FLUOXETINE HYDROCHLORIDE 40 MG/1
80 CAPSULE ORAL DAILY
Qty: 60 CAPSULE | Refills: 1 | Status: SHIPPED | OUTPATIENT
Start: 2021-12-10 | End: 2022-05-02 | Stop reason: SDUPTHER

## 2021-12-10 RX ORDER — ARIPIPRAZOLE 5 MG/1
5 TABLET ORAL DAILY
Qty: 30 TABLET | Refills: 1 | Status: SHIPPED | OUTPATIENT
Start: 2021-12-10 | End: 2022-05-02 | Stop reason: SDUPTHER

## 2021-12-10 NOTE — PROGRESS NOTES
"Chief Complaint   follow-up for anxiety, panic/trauma symptoms    Subjective    Dian Tanner presents to [facility] for her follow up  for  medication management     History of Present Illness : Patient states she is doing good. 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 Reports anxiety is generally good, but exacerbated at work because she wears mask and shield, which makes breathing difficult. She denies feeling depressed. Her  PHQ score is 0. Patient states sleep quality is good. She denies nightmares. Sleep duration averages  7 hours. Denies problems with  appetite or weight fluctuation. Weight stable per EMR.    Objective   Vital Signs:   /82   Pulse 83   Ht 170.2 cm (67.01\")   Wt 73.5 kg (162 lb)   BMI 25.37 kg/m²       Patient screened positive for depression based on a PHQ-9 score of 0 on 12/10/2021. Follow-up recommendations include: Prescribed antidepressant medication treatment.    Appearance- Dian   is a 51-year-old  female.  Appears neatly dressed and clean. Hair pulled up, wearing make-up, appropriately dressed wearing jeans, long sleeve shirt. Appears appropriate for stated age. BMI 25.37   .    Gait, Station, strength-posture upright, gait steady.  No signs of impairment, acute distress, abnormal muscle movements, motor tics or tremors    Mental Status Exam:   Hygiene:   good  Cooperation:  Cooperative, engaged  Eye Contact:  Good  Psychomotor Behavior:  Appropriate  Affect:  Appropriate  Mood:  normal  Speech:  Normal  Thought Process:  Goal directed and Linear  Thought Content:  Normal and optimistic (feels like she is getting better)  Suicidal:  None  Homicidal:  None  Hallucinations:  None  Delusion:  None  Memory:  Intact  Orientation:  Person, Place, Time and Situation  Reliability:  good  Insight:  Good  Judgement:  Fair  Impulse Control:  Fair  Physical/Medical Issues:  HTN, " arthritis, chronic back pain     Current Medications:   Current Outpatient Medications   Medication Sig Dispense Refill   • ARIPiprazole (Abilify) 5 MG tablet Take 1 tablet by mouth Daily. 30 tablet 1   • atenolol (TENORMIN) 25 MG tablet take 1/2-1  tablet by oral route  every day     • busPIRone (BUSPAR) 10 MG tablet Take 1 tablet by mouth 2 (Two) Times a Day.     • cyclobenzaprine (FLEXERIL) 10 MG tablet Take  by mouth Every 12 (Twelve) Hours.     • diazePAM (VALIUM) 5 MG tablet      • estradiol (ESTRACE) 2 MG tablet      • FLUoxetine (PROzac) 40 MG capsule Take 2 capsules by mouth Daily. 60 capsule 1   • gabapentin (NEURONTIN) 300 MG capsule      • HYDROcodone-acetaminophen (NORCO) 5-325 MG per tablet      • hydrOXYzine (ATARAX) 25 MG tablet Take 1 tablet by mouth 2 (Two) Times a Day As Needed for Anxiety (insomnia). 60 tablet 1   • ibuprofen (ADVIL,MOTRIN) 600 MG tablet      • latanoprost (XALATAN) 0.005 % ophthalmic solution      • Linzess 145 MCG capsule capsule      • loratadine (CLARITIN) 10 MG tablet Daily As Needed.     • omeprazole (priLOSEC) 20 MG capsule      • ondansetron (ZOFRAN) 8 MG tablet Take  by mouth Every 8 (Eight) Hours As Needed for Nausea or Vomiting.     • Progesterone (PROMETRIUM) 200 MG capsule      • busPIRone (BUSPAR) 15 MG tablet Take 1 tablet by mouth 3 (Three) Times a Day. Take either always with food or always without 90 tablet 1   • FLUoxetine (PROzac) 20 MG capsule Take 4 capsules by mouth.     • valsartan (DIOVAN) 80 MG tablet        No current facility-administered medications for this visit.     Physical Exam  Vitals reviewed.   Constitutional:       Appearance: Normal appearance. She is well-groomed and normal weight.   Neurological:      General: No focal deficit present.      Mental Status: She is alert and oriented to person, place, and time.      Gait: Gait is intact.   Psychiatric:         Attention and Perception: Attention and perception normal.         Mood and Affect:  Mood normal.         Behavior: Behavior normal. Behavior is cooperative.         Thought Content: Thought content normal. Thought content does not include homicidal or suicidal ideation.         Cognition and Memory: She exhibits impaired remote memory.      Comments: general distrust of strangers.         .Review of Systems   Constitutional: Negative.    Musculoskeletal: Positive for arthralgias.   Psychiatric/Behavioral: Negative for self-injury and suicidal ideas. The patient is nervous/anxious.    All other systems reviewed and are negative.    Assessment/plan  .Diagnoses and all orders for this visit:    1. PTSD (post-traumatic stress disorder)-improving (Primary)  -     ARIPiprazole (Abilify) 5 MG tablet; Take 1 tablet by mouth Daily.  Dispense: 30 tablet; Refill: 1  -     FLUoxetine (PROzac) 40 MG capsule; Take 2 capsules by mouth Daily.  Dispense: 60 capsule; Refill: 1    2. MDD (major depressive disorder), recurrent episode, mild (HCC)  -     ARIPiprazole (Abilify) 5 MG tablet; Take 1 tablet by mouth Daily.  Dispense: 30 tablet; Refill: 1  -     FLUoxetine (PROzac) 40 MG capsule; Take 2 capsules by mouth Daily.  Dispense: 60 capsule; Refill: 1    3. Generalized anxiety disorder with panic attacks-improving  Comments:     Orders:  -     ARIPiprazole (Abilify) 5 MG tablet; Take 1 tablet by mouth Daily.  Dispense: 30 tablet; Refill: 1  -     busPIRone (BUSPAR) 10 MG tablet; Take 1 tablet by mouth 3 (Three) Times a Day.  Dispense: 90 tablet; Refill: 1  -     hydrOXYzine (ATARAX) 25 MG tablet; Take 1 tablet by mouth 2 (Two) Times a Day As Needed for Anxiety (insomnia).  Dispense: 60 tablet; Refill: 1  -     FLUoxetine (PROzac) 40 MG capsule; Take 2 capsules by mouth Daily.  Dispense: 60 capsule; Refill: 1    4. Insomnia secondary to chronic pain-improving  Comments:     Orders:  -     hydrOXYzine (ATARAX) 25 MG tablet; Take 1 tablet by mouth 2 (Two) Times a Day As Needed for Anxiety (insomnia).  Dispense: 60  tablet; Refill: 1    5. Medication management  -     [facility] Drug Screen    Patient states she takes medication as prescribed and denies medication side effects.  Reports improvement in insomnia, anxiety and depression with current medications. Demonstrates improvement in daily function and is maintaining part-time employment.  She feels her medication regimen is effective and prefers no changes be made at this time. AIMS exam conducted in office. No abnormal muscle movement observed or reported by patient.     -Continue Prozac 40 mg - 2 capsules daily for symptoms of anxiety and depression  -Continue BuSpar 15 mg 3 times daily for symptoms of anxiety.  Patient advised to always take medication with food or without food to increase absorption  -Continue Abilify to 5 mg daily for symptoms of agitation and to help augment Prozac for depression  -Continue Hydroxyzine 25 mg twice daily as needed for anxiety and insomnia.       -Patient encouraged to resume psychotherapy  -Patient encouraged to continue exercising and eating healthy  - monitor labs for possible metabolic side effects from Abilify  -Dian is a non-smoker  Reports reviewed include [facility] report, notes    TREATMENT PLAN/GOALS:    Short Term  1. Pt will keep all appointments for med mgt and psychotherapy  2. Pt will take medications as prescribed and report intolerable side effects  3. Pt will continue psychotherapy services to help gain coping skill and process trauma    Long term:   1. Pt will exhibit emotional stability  2. Pt will use coping skills to work through depression  3. Pt will manage feeling of anger using anger management strategies    MEDICATION ISSUES:  We discussed risks, benefits, and side effects of the above medications and the patient was agreeable with the plan. Patient was educated on the importance of compliance with treatment and follow-up appointments.  Patient is agreeable to call the office with any worsening of symptoms or  onset of side effects. Patient is agreeable to call 911 or go to the nearest ER should she begin having SI/HI.     MEDS ORDERED DURING VISIT:  New Medications Ordered This Visit   Medications   • ARIPiprazole (Abilify) 5 MG tablet     Sig: Take 1 tablet by mouth Daily.     Dispense:  30 tablet     Refill:  1   • busPIRone (BUSPAR) 10 MG tablet     Sig: Take 1 tablet by mouth 3 (Three) Times a Day.     Dispense:  90 tablet     Refill:  1   • hydrOXYzine (ATARAX) 25 MG tablet     Sig: Take 1 tablet by mouth 2 (Two) Times a Day As Needed for Anxiety (insomnia).     Dispense:  60 tablet     Refill:  1   • FLUoxetine (PROzac) 40 MG capsule     Sig: Take 2 capsules by mouth Daily.     Dispense:  60 capsule     Refill:  1     Follow Up   Return in about 2 months (around 2/10/2022).    This document has been electronically signed by DELVIS Fox  December 10, 2021 12:58 EST    Part of this note may be an electronic transcription/translation of spoken language to printed text using the Dragon Dictation System.

## 2022-02-23 DIAGNOSIS — F41.0 GENERALIZED ANXIETY DISORDER WITH PANIC ATTACKS: ICD-10-CM

## 2022-02-23 DIAGNOSIS — F41.1 GENERALIZED ANXIETY DISORDER WITH PANIC ATTACKS: ICD-10-CM

## 2022-02-23 DIAGNOSIS — G47.01 INSOMNIA SECONDARY TO CHRONIC PAIN: ICD-10-CM

## 2022-02-23 DIAGNOSIS — G89.29 INSOMNIA SECONDARY TO CHRONIC PAIN: ICD-10-CM

## 2022-02-25 RX ORDER — HYDROXYZINE HYDROCHLORIDE 25 MG/1
25 TABLET, FILM COATED ORAL 2 TIMES DAILY PRN
Qty: 60 TABLET | Refills: 0 | Status: SHIPPED | OUTPATIENT
Start: 2022-02-25 | End: 2022-05-02 | Stop reason: SDUPTHER

## 2022-04-25 DIAGNOSIS — F41.0 GENERALIZED ANXIETY DISORDER WITH PANIC ATTACKS: ICD-10-CM

## 2022-04-25 DIAGNOSIS — F41.1 GENERALIZED ANXIETY DISORDER WITH PANIC ATTACKS: ICD-10-CM

## 2022-04-25 RX ORDER — BUSPIRONE HYDROCHLORIDE 10 MG/1
10 TABLET ORAL 3 TIMES DAILY
Qty: 90 TABLET | Refills: 1 | Status: SHIPPED | OUTPATIENT
Start: 2022-04-25 | End: 2022-05-02 | Stop reason: SDUPTHER

## 2022-05-02 ENCOUNTER — OFFICE VISIT (OUTPATIENT)
Dept: PSYCHIATRY | Facility: CLINIC | Age: 52
End: 2022-05-02

## 2022-05-02 VITALS
BODY MASS INDEX: 26.34 KG/M2 | HEART RATE: 87 BPM | WEIGHT: 167.8 LBS | SYSTOLIC BLOOD PRESSURE: 119 MMHG | DIASTOLIC BLOOD PRESSURE: 77 MMHG | HEIGHT: 67 IN

## 2022-05-02 DIAGNOSIS — G47.01 INSOMNIA SECONDARY TO CHRONIC PAIN: ICD-10-CM

## 2022-05-02 DIAGNOSIS — F33.0 MDD (MAJOR DEPRESSIVE DISORDER), RECURRENT EPISODE, MILD: Primary | ICD-10-CM

## 2022-05-02 DIAGNOSIS — Z79.899 MEDICATION MANAGEMENT: ICD-10-CM

## 2022-05-02 DIAGNOSIS — F43.10 PTSD (POST-TRAUMATIC STRESS DISORDER): ICD-10-CM

## 2022-05-02 DIAGNOSIS — F41.1 GENERALIZED ANXIETY DISORDER WITH PANIC ATTACKS: ICD-10-CM

## 2022-05-02 DIAGNOSIS — F41.0 GENERALIZED ANXIETY DISORDER WITH PANIC ATTACKS: ICD-10-CM

## 2022-05-02 DIAGNOSIS — G89.29 INSOMNIA SECONDARY TO CHRONIC PAIN: ICD-10-CM

## 2022-05-02 LAB
EXTERNAL AMPHETAMINE SCREEN URINE: NEGATIVE
EXTERNAL BENZODIAZEPINE SCREEN URINE: NEGATIVE
EXTERNAL BUPRENORPHINE SCREEN URINE: NEGATIVE
EXTERNAL COCAINE SCREEN URINE: NEGATIVE
EXTERNAL MDMA: NEGATIVE
EXTERNAL METHADONE SCREEN URINE: NEGATIVE
EXTERNAL METHAMPHETAMINE SCREEN URINE: NEGATIVE
EXTERNAL OPIATES SCREEN URINE: NEGATIVE
EXTERNAL OXYCODONE SCREEN URINE: NEGATIVE
EXTERNAL THC SCREEN URINE: NEGATIVE

## 2022-05-02 PROCEDURE — 90792 PSYCH DIAG EVAL W/MED SRVCS: CPT | Performed by: NURSE PRACTITIONER

## 2022-05-02 RX ORDER — FLUOXETINE HYDROCHLORIDE 40 MG/1
40 CAPSULE ORAL DAILY
Qty: 30 CAPSULE | Refills: 2 | Status: SHIPPED | OUTPATIENT
Start: 2022-05-02 | End: 2022-07-31

## 2022-05-02 RX ORDER — FLUOXETINE HYDROCHLORIDE 20 MG/1
20 CAPSULE ORAL DAILY
Qty: 30 CAPSULE | Refills: 2 | Status: SHIPPED | OUTPATIENT
Start: 2022-05-02 | End: 2022-07-31

## 2022-05-02 RX ORDER — ARIPIPRAZOLE 10 MG/1
10 TABLET ORAL DAILY
Qty: 30 TABLET | Refills: 2 | Status: SHIPPED | OUTPATIENT
Start: 2022-05-02 | End: 2022-07-31

## 2022-05-02 RX ORDER — BUSPIRONE HYDROCHLORIDE 15 MG/1
15 TABLET ORAL 3 TIMES DAILY
Qty: 45 TABLET | Refills: 2 | Status: SHIPPED | OUTPATIENT
Start: 2022-05-02

## 2022-05-02 RX ORDER — HYDROXYZINE HYDROCHLORIDE 25 MG/1
25 TABLET, FILM COATED ORAL 2 TIMES DAILY PRN
Qty: 60 TABLET | Refills: 0 | Status: SHIPPED | OUTPATIENT
Start: 2022-05-02

## 2022-05-02 NOTE — PROGRESS NOTES
"Subjective   Dian Tanner is a 51 y.o. female who presents today for initial evaluation     Chief Complaint:  Anxiety, depression    History of Present Illness:   Ms. Tanner presents today for medication management follow up at the [facility] for initial visit after being transferred from [facility] for continuation of treatment. Verbalizes that her symptoms have improved overall, but does complain of sometimes being too \"emotionless.\" She says that she does not feel happy or sad. Rates anxiety 6/10 and rates depression 3/10 with 10 being most severe. Sleeping about five hours per night. Denies trouble falling asleep, but struggles to maintain sleep. Says that she wakes up sometimes five times during the night and experienced daytime fatigue. Appetite has been good. 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 Says that she feels anxious at work and depressed at home. Denies any SI/HI or A/V hallucinations.      The following portions of the patient's history were reviewed and updated as appropriate: allergies, current medications, past family history, past medical history, past social history, past surgical history and problem list.      Past Medical History:  Past Medical History:   Diagnosis Date   • Anxiety    • Chronic pain disorder    • Depression    • Environmental allergies    • Estrogen deficiency    • GERD (gastroesophageal reflux disease)    • Glaucoma    • Headaches, cluster    • High blood pressure    • IBS (irritable bowel syndrome)    • Menopause    • Muscle spasm     Neck and shoulders   • Nausea & vomiting    • PMDD (premenstrual dysphoric disorder)    • Tachycardia        Social History:  Social History     Socioeconomic History   • Marital status:    Tobacco Use   • Smoking status: Never Smoker   • Smokeless tobacco: Never Used   Vaping Use   • Vaping Use: Never used   Substance and Sexual Activity   • Alcohol use: Never   • Drug use: Defer   • " Sexual activity: Yes     Partners: Male     Birth control/protection: Surgical       Family History:  Family History   Problem Relation Age of Onset   • Melanoma Mother    • Anxiety disorder Father    • Depression Father    • Mental illness Father    • Drug abuse Half-Sister    • No Known Problems Cousin    • No Known Problems Half-Sister    • No Known Problems Half-Sister        Past Surgical History:  Past Surgical History:   Procedure Laterality Date   • BREAST SURGERY     • FOOT SURGERY     • SHOULDER SURGERY     • TUBAL COAGULATION LAPAROSCOPIC         Problem List:  Patient Active Problem List   Diagnosis   • Chronic idiopathic constipation   • DDD (degenerative disc disease), cervical   • Otitis media   • Premenstrual dysphoric disorder   • Sore throat   • Upper respiratory infection   • Benign essential hypertension       Allergy:   Allergies   Allergen Reactions   • Prazosin Arrhythmia     Reports increased heart rate when taking Prazosin.   • Sulfamethoxazole Nausea And Vomiting   • Trimethoprim Nausea And Vomiting        Current Medications:   Current Outpatient Medications   Medication Sig Dispense Refill   • ARIPiprazole (Abilify) 10 MG tablet Take 1 tablet by mouth Daily for 90 days. 30 tablet 2   • busPIRone (BUSPAR) 15 MG tablet Take 1 tablet by mouth 3 (Three) Times a Day. 45 tablet 2   • cyclobenzaprine (FLEXERIL) 10 MG tablet Take  by mouth Every 12 (Twelve) Hours.     • diazePAM (VALIUM) 5 MG tablet      • estradiol (ESTRACE) 2 MG tablet      • FLUoxetine (PROzac) 40 MG capsule Take 1 capsule by mouth Daily for 90 days. 30 capsule 2   • gabapentin (NEURONTIN) 300 MG capsule      • HYDROcodone-acetaminophen (NORCO) 5-325 MG per tablet      • hydrOXYzine (ATARAX) 25 MG tablet Take 1 tablet by mouth 2 (Two) Times a Day As Needed for Anxiety (insomnia). 60 tablet 0   • ibuprofen (ADVIL,MOTRIN) 600 MG tablet      • latanoprost (XALATAN) 0.005 % ophthalmic solution      • Linzess 145 MCG capsule  "capsule      • loratadine (CLARITIN) 10 MG tablet Daily As Needed.     • omeprazole (priLOSEC) 20 MG capsule      • ondansetron (ZOFRAN) 8 MG tablet Take  by mouth Every 8 (Eight) Hours As Needed for Nausea or Vomiting.     • Progesterone (PROMETRIUM) 200 MG capsule      • valsartan (DIOVAN) 80 MG tablet      • atenolol (TENORMIN) 25 MG tablet take 1/2-1  tablet by oral route  every day     • FLUoxetine (PROzac) 20 MG capsule Take 1 capsule by mouth Daily for 90 days. 30 capsule 2     No current facility-administered medications for this visit.       Review of Symptoms:    Review of Systems   Constitutional: Positive for fatigue. Negative for activity change, appetite change, unexpected weight gain and unexpected weight loss.   Eyes: Negative for visual disturbance.   Respiratory: Negative for shortness of breath.    Cardiovascular: Negative for chest pain and palpitations.   Psychiatric/Behavioral: Positive for dysphoric mood and sleep disturbance. Negative for suicidal ideas. The patient is nervous/anxious.      Physical Exam:   Physical Exam  Vitals reviewed.   Constitutional:       General: She is not in acute distress.     Appearance: Normal appearance.   Neurological:      Mental Status: She is alert.      Gait: Gait normal.       Vitals:   Blood pressure 119/77, pulse 87, height 170.2 cm (67.01\"), weight 76.1 kg (167 lb 12.8 oz).    Mental Status Exam:   Hygiene:   good  Cooperation:  Cooperative  Eye Contact:  Good  Psychomotor Behavior:  Appropriate  Affect:  Appropriate  Mood: normal  Hopelessness: Denies  Speech:  Monotone  Thought Process:  Goal directed and Linear  Thought Content:  Mood congruent  Suicidal:  None  Homicidal:  None  Hallucinations:  None  Delusion:  None  Memory:  Intact  Orientation:  Person, Place, Time and Situation  Reliability:  fair  Insight:  Fair  Judgement:  Fair  Impulse Control:  Fair    Lab Results:   Office Visit on 05/02/2022   Component Date Value Ref Range Status   • " External Amphetamine Screen Urine 05/02/2022 Negative   Final   • External Benzodiazepine Screen Uri* 05/02/2022 Negative   Final   • External Cocaine Screen Urine 05/02/2022 Negative   Final   • External THC Screen Urine 05/02/2022 Negative   Final   • External Methadone Screen Urine 05/02/2022 Negative   Final   • External Methamphetamine Screen Ur* 05/02/2022 Negative   Final   • External Oxycodone Screen Urine 05/02/2022 Negative   Final   • External Buprenorphine Screen Urine 05/02/2022 Negative   Final   • External MDMA 05/02/2022 Negative   Final   • External Opiates Screen Urine 05/02/2022 Negative   Final   Office Visit on 12/10/2021   Component Date Value Ref Range Status   • External Amphetamine Screen Urine 12/10/2021 Negative   Final   • Amphetamine Cut-Off 12/10/2021 1000ng/ml'   Final   • External Benzodiazepine Screen Uri* 12/10/2021 Positive   Final   • Benzodiazipine Cut-Off 12/10/2021 300ng/ml   Final   • External Cocaine Screen Urine 12/10/2021 Negative   Final   • Cocaine Cut-Off 12/10/2021 300ng/ml   Final   • External THC Screen Urine 12/10/2021 Negative   Final   • THC Cut-Off 12/10/2021 50ng/ml   Final   • External Methadone Screen Urine 12/10/2021 Negative   Final   • Methadone Cut-Off 12/10/2021 300ng/ml   Final   • External Methamphetamine Screen Ur* 12/10/2021 Negative   Final   • Methamphetamine Cut-Off 12/10/2021 1000ng/ml   Final   • External Oxycodone Screen Urine 12/10/2021 Negative   Final   • Oxycodone Cut-Off 12/10/2021 100ng/ml   Final   • External Buprenorphine Screen Urine 12/10/2021 Negative   Final   • Buprenorphine Cut-Off 12/10/2021 10ng/ml   Final   • External MDMA 12/10/2021 Negative   Final   • MDMA Cut-Off 12/10/2021 500ng/ml   Final   • External Opiates Screen Urine 12/10/2021 Negative   Final   • Opiates Cut-Off 12/10/2021 300ng/ml   Final       EKG Results:  No orders to display       Assessment/Plan   Problems Addressed this Visit    None     Visit Diagnoses      MDD (major depressive disorder), recurrent episode, mild (HCC)    -  Primary    Relevant Medications    ARIPiprazole (Abilify) 10 MG tablet    busPIRone (BUSPAR) 15 MG tablet    hydrOXYzine (ATARAX) 25 MG tablet    FLUoxetine (PROzac) 40 MG capsule    FLUoxetine (PROzac) 20 MG capsule    PTSD (post-traumatic stress disorder)-improving        Relevant Medications    ARIPiprazole (Abilify) 10 MG tablet    busPIRone (BUSPAR) 15 MG tablet    hydrOXYzine (ATARAX) 25 MG tablet    FLUoxetine (PROzac) 40 MG capsule    FLUoxetine (PROzac) 20 MG capsule    Generalized anxiety disorder with panic attacks-improving             Relevant Medications    ARIPiprazole (Abilify) 10 MG tablet    busPIRone (BUSPAR) 15 MG tablet    hydrOXYzine (ATARAX) 25 MG tablet    FLUoxetine (PROzac) 40 MG capsule    FLUoxetine (PROzac) 20 MG capsule    Insomnia secondary to chronic pain-improving             Relevant Medications    hydrOXYzine (ATARAX) 25 MG tablet    Medication management        Relevant Orders    [facility] Drug Screen (Completed)      Diagnoses       Codes Comments    MDD (major depressive disorder), recurrent episode, mild (HCC)    -  Primary ICD-10-CM: F33.0  ICD-9-CM: 296.31     PTSD (post-traumatic stress disorder)-improving     ICD-10-CM: F43.10  ICD-9-CM: 309.81     Generalized anxiety disorder with panic attacks-improving     ICD-10-CM: F41.1, F41.0  ICD-9-CM: 300.02, 300.01      Insomnia secondary to chronic pain-improving     ICD-10-CM: G89.29, G47.01  ICD-9-CM: 338.29, 327.01      Medication management     ICD-10-CM: Z79.899  ICD-9-CM: V58.69           Visit Diagnoses:    ICD-10-CM ICD-9-CM   1. MDD (major depressive disorder), recurrent episode, mild (HCC)  F33.0 296.31   2. PTSD (post-traumatic stress disorder)-improving  F43.10 309.81   3. Generalized anxiety disorder with panic attacks-improving  F41.1 300.02    F41.0 300.01   4. Insomnia secondary to chronic pain-improving  G89.29 338.29    G47.01 327.01   5.  Medication management  Z79.899 V58.69       GOALS:  Short Term Goals: Patient will be compliant with medication, and patient will have no significant medication related side effects.  Patient will be engaged in psychotherapy as indicated.  Patient will report subjective improvement of symptoms.  Long term goals: To stabilize mood and treat/improve subjective symptoms, the patient will stay out of the hospital, the patient will be at an optimal level of functioning, and the patient will take all medications as prescribed.  The patient/guardian verbalized understanding and agreement with goals that were mutually set.    TREATMENT PLAN: Continue supportive psychotherapy efforts and medications as indicated for patient's diagnosis.  Pharmacological and Non-Pharmacological treatment options discussed during today's visit. Patient/Guardian acknowledged and verbally consented with current treatment plan and was educated on the importance of compliance with treatment and follow-up appointments.      Discussed medication options and treatment plan of prescribed medication as well as the risks, benefits, any black box warnings, and side effects including potential falls, possible impaired driving, and metabolic adversities among others. Patient is agreeable to call the office with any worsening of symptoms or onset of side effects, or if any concerns or questions arise.  The contact information for the office is made available to the patient. Patient is agreeable to call 911 or go to the nearest ER should they begin having any SI/HI, or if any urgent concerns arise. No medication side effects or related complaints today.     -Reviewed previous available documentation     -Reviewed most recent available labs      -JANENE reviewed and is appropriate.    -The benefits of a healthy diet and exercise were discussed with patient, especially the positive effects they have on mental health. Patient encouraged to consider lifestyle  "modification regarding diet and exercise patterns to maximize results of mental health treatment.     -Discussed importance of counseling to decrease anxiety like symptoms. Encouraged her to continue psychotherapy. Discussed coping mechanisms to decrease stress and anxiety: relaxation techniques, guided imagery, music therapy, staying active, support groups, diversional activities and avoid aggravating factors.  Discussed different coping mechanisms to better control depression.    Discussed in detail, the possible side effects of antipsychotic medications including increased cholesterol, increased blood sugar, and the possibility of gaining weight. Also discussed risk of muscle movement disorders with this class of medication. Labs will need to be monitored regularly while taking this type of medication.     -Decrease Prozac from 80 mg daily to 60 mg daily for depression and to help decrease her reports of feeling \"emotionless\"  -Increase Aripiprazole from 5 mg to 10 mg daily to help with depression, encouraged her to take medication at night to help with possible daytime fatigue.   -Increase Buspirone from 30 mg daily to 45 mg daily for anxiety.  -Continue Hydroxyzine 25 mg twice daily as needed for anxiety.     MEDS ORDERED DURING VISIT:  New Medications Ordered This Visit   Medications   • ARIPiprazole (Abilify) 10 MG tablet     Sig: Take 1 tablet by mouth Daily for 90 days.     Dispense:  30 tablet     Refill:  2   • busPIRone (BUSPAR) 15 MG tablet     Sig: Take 1 tablet by mouth 3 (Three) Times a Day.     Dispense:  45 tablet     Refill:  2   • hydrOXYzine (ATARAX) 25 MG tablet     Sig: Take 1 tablet by mouth 2 (Two) Times a Day As Needed for Anxiety (insomnia).     Dispense:  60 tablet     Refill:  0   • FLUoxetine (PROzac) 40 MG capsule     Sig: Take 1 capsule by mouth Daily for 90 days.     Dispense:  30 capsule     Refill:  2   • FLUoxetine (PROzac) 20 MG capsule     Sig: Take 1 capsule by mouth Daily for " 90 days.     Dispense:  30 capsule     Refill:  2       FOLLOW UP:  Return in about 6 weeks (around 6/13/2022) for Recheck.             This document has been electronically signed by [facility]ell[facility]  May 2, 2022 09:23 EDT    Please note that portions of this note were completed with a voice recognition program. Efforts were made to edit dictation, but occasionally words are mistranscribed.

## 2022-05-04 ENCOUNTER — TELEPHONE (OUTPATIENT)
Dept: PSYCHIATRY | Facility: CLINIC | Age: 52
End: 2022-05-04

## 2022-05-04 NOTE — TELEPHONE ENCOUNTER
Pt called stating that she is taking  80 mg prozac and that you wrote on for 20 mg for her refill.  And she is wondering if you wanted her to decrease that fast with the prozac.

## 2022-05-04 NOTE — TELEPHONE ENCOUNTER
She was taking Prozac 80 mg daily so we decreased daily dose to 60 mg. She was sent prescription for Prozac 40 mg and Prozac 20 mg daily for total of 60 mg. She may not have been given the prescription for 40 mg from pharmacy.

## 2023-04-05 ENCOUNTER — TRANSCRIBE ORDERS (OUTPATIENT)
Dept: ADMINISTRATIVE | Facility: HOSPITAL | Age: 53
End: 2023-04-05
Payer: MEDICAID

## 2023-04-05 DIAGNOSIS — Z12.31 VISIT FOR SCREENING MAMMOGRAM: Primary | ICD-10-CM

## 2023-05-01 ENCOUNTER — HOSPITAL ENCOUNTER (OUTPATIENT)
Dept: MAMMOGRAPHY | Facility: HOSPITAL | Age: 53
Discharge: HOME | End: 2023-05-01
Admitting: OBSTETRICS & GYNECOLOGY
Payer: MEDICAID

## 2023-05-01 DIAGNOSIS — Z12.31 VISIT FOR SCREENING MAMMOGRAM: ICD-10-CM

## 2023-05-01 PROCEDURE — 77067 SCR MAMMO BI INCL CAD: CPT

## 2023-05-01 PROCEDURE — 77063 BREAST TOMOSYNTHESIS BI: CPT

## 2025-08-23 ENCOUNTER — APPOINTMENT (OUTPATIENT)
Dept: GENERAL RADIOLOGY | Facility: HOSPITAL | Age: 55
End: 2025-08-23
Payer: COMMERCIAL

## 2025-08-23 ENCOUNTER — HOSPITAL ENCOUNTER (EMERGENCY)
Facility: HOSPITAL | Age: 55
Discharge: HOME | End: 2025-08-23
Payer: COMMERCIAL